# Patient Record
Sex: FEMALE | NOT HISPANIC OR LATINO | Employment: FULL TIME | URBAN - METROPOLITAN AREA
[De-identification: names, ages, dates, MRNs, and addresses within clinical notes are randomized per-mention and may not be internally consistent; named-entity substitution may affect disease eponyms.]

---

## 2017-10-02 ENCOUNTER — OFFICE VISIT (OUTPATIENT)
Dept: FAMILY MEDICINE | Facility: CLINIC | Age: 37
End: 2017-10-02
Payer: MEDICARE

## 2017-10-02 VITALS
DIASTOLIC BLOOD PRESSURE: 68 MMHG | TEMPERATURE: 98 F | OXYGEN SATURATION: 97 % | BODY MASS INDEX: 27.1 KG/M2 | HEIGHT: 64 IN | SYSTOLIC BLOOD PRESSURE: 110 MMHG | RESPIRATION RATE: 20 BRPM | WEIGHT: 158.75 LBS | HEART RATE: 92 BPM

## 2017-10-02 DIAGNOSIS — J42 CHRONIC BRONCHITIS WITH ACUTE EXACERBATION: Primary | ICD-10-CM

## 2017-10-02 DIAGNOSIS — G89.29 CHRONIC NECK PAIN: ICD-10-CM

## 2017-10-02 DIAGNOSIS — R05.9 COUGH: ICD-10-CM

## 2017-10-02 DIAGNOSIS — J20.9 CHRONIC BRONCHITIS WITH ACUTE EXACERBATION: Primary | ICD-10-CM

## 2017-10-02 DIAGNOSIS — R06.02 SHORTNESS OF BREATH ON EXERTION: ICD-10-CM

## 2017-10-02 DIAGNOSIS — M54.2 CHRONIC NECK PAIN: ICD-10-CM

## 2017-10-02 PROCEDURE — 99204 OFFICE O/P NEW MOD 45 MIN: CPT | Mod: PBBFAC,PO | Performed by: NURSE PRACTITIONER

## 2017-10-02 PROCEDURE — 99999 PR PBB SHADOW E&M-NEW PATIENT-LVL IV: CPT | Mod: PBBFAC,,, | Performed by: NURSE PRACTITIONER

## 2017-10-02 PROCEDURE — 99203 OFFICE O/P NEW LOW 30 MIN: CPT | Mod: S$PBB,,, | Performed by: NURSE PRACTITIONER

## 2017-10-02 RX ORDER — CARBAMAZEPINE 100 MG/1
300 TABLET, CHEWABLE ORAL NIGHTLY
Status: ON HOLD | COMMUNITY
End: 2017-11-03

## 2017-10-02 RX ORDER — OLANZAPINE 20 MG/1
20 TABLET ORAL NIGHTLY
COMMUNITY

## 2017-10-02 RX ORDER — ATOMOXETINE 60 MG/1
60 CAPSULE ORAL EVERY MORNING
COMMUNITY

## 2017-10-02 RX ORDER — IBUPROFEN 800 MG/1
800 TABLET ORAL 3 TIMES DAILY PRN
Qty: 30 TABLET | Refills: 0 | Status: ON HOLD | OUTPATIENT
Start: 2017-10-02 | End: 2017-11-03 | Stop reason: HOSPADM

## 2017-10-02 RX ORDER — OLANZAPINE 5 MG/1
5 TABLET ORAL EVERY MORNING
COMMUNITY
Start: 2017-09-02

## 2017-10-02 RX ORDER — DOXEPIN HYDROCHLORIDE 150 MG/1
150 CAPSULE ORAL NIGHTLY PRN
COMMUNITY

## 2017-10-02 RX ORDER — GABAPENTIN 400 MG/1
400 CAPSULE ORAL 3 TIMES DAILY
COMMUNITY
Start: 2017-09-15

## 2017-10-02 RX ORDER — BENZONATATE 200 MG/1
200 CAPSULE ORAL 3 TIMES DAILY PRN
Qty: 30 CAPSULE | Refills: 0 | Status: SHIPPED | OUTPATIENT
Start: 2017-10-02 | End: 2017-10-12

## 2017-10-02 RX ORDER — NAPROXEN 500 MG/1
500 TABLET ORAL 2 TIMES DAILY
COMMUNITY
End: 2017-10-02

## 2017-10-02 RX ORDER — CLONAZEPAM 1 MG/1
1 TABLET ORAL 3 TIMES DAILY
Status: ON HOLD | COMMUNITY
Start: 2017-09-15 | End: 2017-11-03

## 2017-10-02 RX ORDER — ALBUTEROL SULFATE 90 UG/1
2 AEROSOL, METERED RESPIRATORY (INHALATION) EVERY 6 HOURS PRN
Qty: 18 G | Refills: 0 | Status: SHIPPED | OUTPATIENT
Start: 2017-10-02 | End: 2018-10-02

## 2017-10-02 RX ORDER — CARBAMAZEPINE 200 MG/1
200 TABLET ORAL EVERY MORNING
COMMUNITY
Start: 2017-09-15

## 2017-10-02 NOTE — PATIENT INSTRUCTIONS
What Is Acute Bronchitis?  Acute bronchitis is when the airways in your lungs (bronchial tubes) become red and swollen (inflamed). It is usually caused by a viral infection. But it can also occur because of a bacteria or allergen. Symptoms include a cough that produces yellow or greenish mucus and can last for days or sometimes weeks.  Inside healthy lungs    Air travels in and out of the lungs through the airways. The linings of these airways produce sticky mucus. This mucus traps particles that enter the lungs. Tiny structures called cilia then sweep the particles out of the airways.     Healthy airway: Airways are normally open. Air moves in and out easily.      Healthy cilia: Tiny, hairlike cilia sweep mucus and particles up and out of the airways.   Lungs with bronchitis  Bronchitis often occurs with a cold or the flu virus. The airways become inflamed (red and swollen). There is a deep hacking cough from the extra mucus. Other symptoms may include:  · Wheezing  · Chest discomfort  · Shortness of breath  · Mild fever  A second infection, this time due to bacteria, may then occur. And airways irritated by allergens or smoke are more likely to get infected.        Inflamed airway: Inflammation and extra mucus narrow the airway, causing shortness of breath.      Impaired cilia: Extra mucus impairs cilia, causing congestion and wheezing. Smoking makes the problem worse.   Making a diagnosis  A physical exam, health history, and certain tests help your healthcare provider make the diagnosis.  Health history  Your healthcare provider will ask you about your symptoms.  The exam  Your provider listens to your chest for signs of congestion. He or she may also check your ears, nose, and throat.  Possible tests  · A sputum test for bacteria. This requires a sample of mucus from your lungs.  · A nasal or throat swab. This tests to see if you have a bacterial infection.  · A chest X-ray. This is done if your healthcare  provider thinks you have pneumonia.  · Tests to check for an underlying condition. Other tests may be done to check for things such as allergies, asthma, or COPD (chronic obstructive pulmonary disease). You may need to see a specialist for more lung function testing.  Treating a cough  The main treatment for bronchitis is easing symptoms. Avoiding smoke, allergens, and other things that trigger coughing can often help. If the infection is bacterial, you may be given antibiotics. During the illness, it's important to get plenty of sleep. To ease symptoms:  · Dont smoke. Also avoid secondhand smoke.  · Use a humidifier. Or try breathing in steam from a hot shower. This may help loosen mucus.  · Drink a lot of water and juice. They can soothe the throat and may help thin mucus.  · Sit up or use extra pillows when in bed. This helps to lessen coughing and congestion.  · Ask your provider about using medicine. Ask about using cough medicine, pain and fever medicine, or a decongestant.  Antibiotics  Most cases of bronchitis are caused by cold or flu viruses. They dont need antibiotics to treat them, even if your mucus is thick and green or yellow. Antibiotics dont treat viral illness and antibiotics have not been shown to have any benefit in cases of acute bronchitis. Taking antibiotics when they are not needed increases your risk of getting an infection later that is antibiotic-resistant. Antibiotics can also cause severe cases of diarrhea that require other antibiotics to treat.  It is important that you accept your healthcare provider's opinion to not use antibiotics. Your provider will prescribe antibiotics if the infection is caused by bacteria. If they are prescribed:  · Take all of the medicine. Take the medicine until it is used up, even if symptoms have improved. If you dont, the bronchitis may come back.  · Take the medicines as directed. For instance, some medicines should be taken with food.  · Ask about  side effects. Ask your provider or pharmacist what side effects are common, and what to do about them.  Follow-up care  You should see your provider again in 2 to 3 weeks. By this time, symptoms should have improved. An infection that lasts longer may mean you have a more serious problem.  Prevention  · Avoid tobacco smoke. If you smoke, quit. Stay away from smoky places. Ask friends and family not to smoke around you, or in your home or car.  · Get checked for allergies.  · Ask your provider about getting a yearly flu shot. Also ask about pneumococcal or pneumonia shots.  · Wash your hands often. This helps reduce the chance of picking up viruses that cause colds and flu.  Call your healthcare provider if:  · Symptoms worsen, or you have new symptoms  · Breathing problems worsen or  become severe  · Symptoms dont get better within a week, or within 3 days of taking antibiotics   Date Last Reviewed: 2/1/2017  © 0779-6091 [employer], [employer]. [address] [place] [address], [place] [address]. All rights reserved. This information is not intended as a substitute for professional medical care. Always follow your healthcare professional's instructions.
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
## 2017-10-17 ENCOUNTER — TELEPHONE (OUTPATIENT)
Dept: FAMILY MEDICINE | Facility: CLINIC | Age: 37
End: 2017-10-17

## 2017-10-17 NOTE — TELEPHONE ENCOUNTER
Left message for patient to call the office back regarding her appointment for Thursday 10/19. We need to reschedule due to Dr. Barfield's availability. Waiting on call back.

## 2017-10-30 ENCOUNTER — OFFICE VISIT (OUTPATIENT)
Dept: HEMATOLOGY/ONCOLOGY | Facility: CLINIC | Age: 37
End: 2017-10-30
Payer: MEDICARE

## 2017-10-30 VITALS
SYSTOLIC BLOOD PRESSURE: 112 MMHG | DIASTOLIC BLOOD PRESSURE: 73 MMHG | TEMPERATURE: 98 F | RESPIRATION RATE: 18 BRPM | BODY MASS INDEX: 28.91 KG/M2 | HEART RATE: 96 BPM | WEIGHT: 168.38 LBS

## 2017-10-30 DIAGNOSIS — N92.0 MENORRHAGIA WITH REGULAR CYCLE: ICD-10-CM

## 2017-10-30 DIAGNOSIS — Z98.84 H/O BARIATRIC SURGERY: ICD-10-CM

## 2017-10-30 DIAGNOSIS — K95.89 IRON DEFICIENCY ANEMIA FOLLOWING BARIATRIC SURGERY: ICD-10-CM

## 2017-10-30 DIAGNOSIS — D50.8 IRON DEFICIENCY ANEMIA FOLLOWING BARIATRIC SURGERY: ICD-10-CM

## 2017-10-30 DIAGNOSIS — D50.9 MICROCYTIC ANEMIA: ICD-10-CM

## 2017-10-30 PROCEDURE — 99203 OFFICE O/P NEW LOW 30 MIN: CPT | Mod: ,,, | Performed by: INTERNAL MEDICINE

## 2017-10-30 RX ORDER — DIPHENHYDRAMINE HYDROCHLORIDE 50 MG/ML
25 INJECTION INTRAMUSCULAR; INTRAVENOUS
Status: CANCELLED
Start: 2017-11-02

## 2017-10-30 NOTE — PROGRESS NOTES
[facility] Hematolgy/Oncology  History & Physical    Subjective:      Patient ID:   NAME: Amaris Kruger : 1980     37 y.o. female    Referring Doc: Leslye Calvert NP; RODNEY Maddox  Other Physicians: Mario Roman        Chief Complaint:   Anemia referral    HPI:  37 y.o. female with diagnosis of iron deficiency anemia since 2015 at least who has been referred by [facility] for evaluation by medical hematology/oncology. She had history of gastric bypass in . She has required several blood transfusions per year since . She was seen by Dr Mario Gutierrez with hematology in [facility] in  and received IV ferrlicet. She just recently moved to [place]. She has been to ER at [facility] recently with fatigue and lack energy. She has been passing out on occasion and having some dizziness; no CP, SOB, HA's or N/V. Her periods have been light and she denies any BRBPR. She reports that in past she has had heavy periods.             ROS:   GEN: normal without any fever, night sweats or weight loss  HEENT: normal with no HA's, sore throat, stiff neck, changes in vision  CV: normal with no CP, SOB, PND, MCFARLANE or orthopnea  PULM: normal with no SOB, cough, hemoptysis, sputum or pleuritic pain  GI: normal with no abdominal pain, nausea, vomiting, constipation, diarrhea, melanotic stools, BRBPR, or hematemesis  : normal with no hematuria, dysuria  BREAST: normal with no mass, discharge, pain  SKIN: normal with no rash, erythema, bruising, or swelling       Past Medical/Surgical History:  Past Medical History:   Diagnosis Date    Anxiety     Back pain     Bipolar 1 disorder     Bipolar 1 disorder     Gastric bypass status for obesity     Microcytic anemia 10/30/2017     Past Surgical History:   Procedure Laterality Date     SECTION, CLASSIC      CHOLECYSTECTOMY      GASTRIC BYPASS      GASTRIC BYPASS           Allergies:  Review of patient's allergies indicates:   Allergen Reactions     Haldol [haloperidol lactate] Other (See Comments)    Effexor xr [venlafaxine] Itching       Social/Family History:  Social History     Social History    Marital status: Single     Spouse name: N/A    Number of children: N/A    Years of education: N/A     Occupational History    Not on file.     Social History Main Topics    Smoking status: Current Every Day Smoker     Packs/day: 0.50     Years: 19.00     Types: Vaping with nicotine    Smokeless tobacco: Never Used      Comment: patient has tried with chantrix    Alcohol use No    Drug use: No    Sexual activity: Yes     Birth control/ protection: None     Other Topics Concern    Not on file     Social History Narrative    No narrative on file     Family History   Problem Relation Age of Onset    Hypertension Paternal Uncle          Medications:    Current Outpatient Prescriptions:     albuterol 90 mcg/actuation inhaler, Inhale 2 puffs into the lungs every 6 (six) hours as needed for Wheezing. Rescue, Disp: 18 g, Rfl: 0    atomoxetine (STRATTERA) 18 MG capsule, Take 18 mg by mouth once daily., Disp: , Rfl:     carbamazepine (TEGRETOL) 200 mg tablet, Take 200 mg by mouth once daily., Disp: , Rfl:     clonazePAM (KLONOPIN) 1 MG tablet, Take 1 mg by mouth 3 (three) times daily., Disp: , Rfl:     doxepin (SINEQUAN) 150 MG Cap, Take 150 mg by mouth nightly as needed., Disp: , Rfl:     gabapentin (NEURONTIN) 300 MG capsule, Take 300 mg by mouth once daily., Disp: , Rfl:     ibuprofen (ADVIL,MOTRIN) 800 MG tablet, Take 1 tablet (800 mg total) by mouth 3 (three) times daily as needed for Pain. DO NOT TAKE WITH NAPROXEN OR ANY OTHER NSAID OR ASPIRIN., Disp: 30 tablet, Rfl: 0    olanzapine (ZYPREXA) 20 MG tablet, Take 20 mg by mouth every evening., Disp: , Rfl:     carbamazepine (TEGRETOL) 100 mg chewable tablet, 300 mg once daily., Disp: , Rfl:     olanzapine (ZYPREXA) 5 MG tablet, Take 5 mg by mouth 2 (two) times daily., Disp: , Rfl:        Pathology:  No matching staging information was found for the patient.      Objective:   Vitals:  Blood pressure 112/73, pulse 96, temperature 98.3 °F (36.8 °C), resp. rate 18, weight 76.4 kg (168 lb 6.4 oz), last menstrual period 09/02/2017.    Physical Examination:   GEN: no apparent distress, comfortable; AAOx3  HEAD: atraumatic and normocephalic  EYES: no pallor, no icterus, PERRLA  ENT: OMM, no pharyngeal erythema, external ears WNL; no nasal discharge; no thrush  NECK: no masses, thyroid normal, trachea midline, no LAD/LN's, supple  CV: RRR with no murmur; normal pulse; normal S1 and S2; no pedal edema  CHEST: Normal respiratory effort; CTAB; normal breath sounds; no wheeze or crackles  ABDOM: nontender and nondistended; soft; normal bowel sounds; no rebound/guarding  MUSC/Skeletal: ROM normal; no crepitus; joints normal; no deformities or arthropathy  EXTREM: no clubbing, cyanosis, inflammation or swelling  SKIN: no rashes, lesions, ulcers, petechiae or subcutaneous nodules  : no graham  NEURO: grossly intact; motor/sensory WNL; AAOx3; no tremors  PSYCH: normal mood, affect and behavior  LYMPH: normal cervical, supraclavicular, axillary and groin LN's      Labs:     10/27/2017   7/31/2015 in records    Radiology/Diagnostic Studies:          All lab results and imaging results have been reviewed and discussed with the patient    Assessment:   (1) 37 y.o. female with diagnosis of chronic iron deficiency anemia after having gastric bypass in 2014  - she was seen by Dr Mario Gutierrez with hematology in [facility] in 2015 for ferrlicet infusions  - latest hgb is at 7.5 with MCV of 67.1  - she seems to be symtomatic    (2) Chronic bronchitis/asthma/chronic smoker    (3) Chronic neck issues    (4) GERD    (5) Bipolar disorder            Plan:           PLAN:  1. Check up to date iron panel and ferrtin  2. Set up two units of blood then set up IV ferrlicet x 8 weeks  3. F/u with PCP  4.  RTC in 4 weeks   Fax  note to No ref. provider found        I have explained and the patient understands all of  the current recommendation(s). I have answered all of their questions to the best of my ability and to their complete satisfaction.             Thank you for allowing me to participate in this patient's care. Please call with any questions or concerns.    Electronically signed Marquis Sumner MD

## 2017-11-01 ENCOUNTER — ANESTHESIA (OUTPATIENT)
Dept: SURGERY | Facility: HOSPITAL | Age: 37
DRG: 355 | End: 2017-11-01
Payer: MEDICARE

## 2017-11-01 ENCOUNTER — HOSPITAL ENCOUNTER (INPATIENT)
Facility: HOSPITAL | Age: 37
LOS: 1 days | Discharge: HOME | DRG: 355 | End: 2017-11-03
Attending: EMERGENCY MEDICINE | Admitting: FAMILY MEDICINE
Payer: MEDICARE

## 2017-11-01 ENCOUNTER — SURGERY (OUTPATIENT)
Age: 37
End: 2017-11-01

## 2017-11-01 ENCOUNTER — ANESTHESIA EVENT (OUTPATIENT)
Dept: SURGERY | Facility: HOSPITAL | Age: 37
DRG: 355 | End: 2017-11-01
Payer: MEDICARE

## 2017-11-01 DIAGNOSIS — K56.2 VOLVULUS: Primary | ICD-10-CM

## 2017-11-01 DIAGNOSIS — Q43.3 MIDGUT VOLVULUS: ICD-10-CM

## 2017-11-01 DIAGNOSIS — R10.9 ABDOMINAL PAIN, UNSPECIFIED ABDOMINAL LOCATION: ICD-10-CM

## 2017-11-01 PROBLEM — F31.9 BIPOLAR 1 DISORDER: Status: ACTIVE | Noted: 2017-11-01

## 2017-11-01 PROBLEM — F17.210 CIGARETTE NICOTINE DEPENDENCE WITHOUT COMPLICATION: Status: ACTIVE | Noted: 2017-11-01

## 2017-11-01 LAB
ABO + RH BLD: NORMAL
ALBUMIN SERPL BCP-MCNC: 3.1 G/DL
ALP SERPL-CCNC: 78 U/L
ALT SERPL W/O P-5'-P-CCNC: 39 U/L
ANION GAP SERPL CALC-SCNC: 11 MMOL/L
ANISOCYTOSIS BLD QL SMEAR: ABNORMAL
APTT BLDCRRT: 22 SEC
AST SERPL-CCNC: 32 U/L
BASOPHILS NFR BLD: 0 %
BILIRUB SERPL-MCNC: 0.1 MG/DL
BLD GP AB SCN CELLS X3 SERPL QL: NORMAL
BUN SERPL-MCNC: 12 MG/DL
CALCIUM SERPL-MCNC: 8.4 MG/DL
CHLORIDE SERPL-SCNC: 108 MMOL/L
CO2 SERPL-SCNC: 18 MMOL/L
CREAT SERPL-MCNC: 0.7 MG/DL
DIFFERENTIAL METHOD: ABNORMAL
EOSINOPHIL NFR BLD: 2 %
ERYTHROCYTE [DISTWIDTH] IN BLOOD BY AUTOMATED COUNT: 23.7 %
EST. GFR  (AFRICAN AMERICAN): >60 ML/MIN/1.73 M^2
EST. GFR  (NON AFRICAN AMERICAN): >60 ML/MIN/1.73 M^2
GLUCOSE SERPL-MCNC: 102 MG/DL
HCT VFR BLD AUTO: 27.6 %
HGB BLD-MCNC: 8.5 G/DL
HYPOCHROMIA BLD QL SMEAR: ABNORMAL
INR PPP: 0.9
LACTATE SERPL-SCNC: 0.4 MMOL/L
LIPASE SERPL-CCNC: 29 U/L
LYMPHOCYTES NFR BLD: 27 %
MCH RBC QN AUTO: 20.7 PG
MCHC RBC AUTO-ENTMCNC: 30.6 G/DL
MCV RBC AUTO: 68 FL
MONOCYTES NFR BLD: 3 %
NEUTROPHILS NFR BLD: 64 %
NEUTS BAND NFR BLD MANUAL: 4 %
PLATELET # BLD AUTO: 319 K/UL
PLATELET BLD QL SMEAR: ABNORMAL
PMV BLD AUTO: 7.4 FL
POLYCHROMASIA BLD QL SMEAR: ABNORMAL
POTASSIUM SERPL-SCNC: 3.5 MMOL/L
PROT SERPL-MCNC: 6.5 G/DL
PROTHROMBIN TIME: 9.9 SEC
RBC # BLD AUTO: 4.09 M/UL
SODIUM SERPL-SCNC: 137 MMOL/L
WBC # BLD AUTO: 4.7 K/UL

## 2017-11-01 PROCEDURE — 93005 ELECTROCARDIOGRAM TRACING: CPT

## 2017-11-01 PROCEDURE — 80053 COMPREHEN METABOLIC PANEL: CPT

## 2017-11-01 PROCEDURE — 36000706: Performed by: SURGERY

## 2017-11-01 PROCEDURE — 99291 CRITICAL CARE FIRST HOUR: CPT | Mod: 25

## 2017-11-01 PROCEDURE — 86850 RBC ANTIBODY SCREEN: CPT

## 2017-11-01 PROCEDURE — 93010 ELECTROCARDIOGRAM REPORT: CPT | Mod: ,,, | Performed by: INTERNAL MEDICINE

## 2017-11-01 PROCEDURE — 25000003 PHARM REV CODE 250: Performed by: EMERGENCY MEDICINE

## 2017-11-01 PROCEDURE — 83605 ASSAY OF LACTIC ACID: CPT

## 2017-11-01 PROCEDURE — 85730 THROMBOPLASTIN TIME PARTIAL: CPT

## 2017-11-01 PROCEDURE — 96375 TX/PRO/DX INJ NEW DRUG ADDON: CPT

## 2017-11-01 PROCEDURE — 86900 BLOOD TYPING SEROLOGIC ABO: CPT

## 2017-11-01 PROCEDURE — 96365 THER/PROPH/DIAG IV INF INIT: CPT

## 2017-11-01 PROCEDURE — 85610 PROTHROMBIN TIME: CPT

## 2017-11-01 PROCEDURE — 25500020 PHARM REV CODE 255

## 2017-11-01 PROCEDURE — 71000033 HC RECOVERY, INTIAL HOUR: Performed by: SURGERY

## 2017-11-01 PROCEDURE — 71000039 HC RECOVERY, EACH ADD'L HOUR: Performed by: SURGERY

## 2017-11-01 PROCEDURE — C1729 CATH, DRAINAGE: HCPCS | Performed by: SURGERY

## 2017-11-01 PROCEDURE — C1765 ADHESION BARRIER: HCPCS | Performed by: SURGERY

## 2017-11-01 PROCEDURE — C9113 INJ PANTOPRAZOLE SODIUM, VIA: HCPCS | Performed by: EMERGENCY MEDICINE

## 2017-11-01 PROCEDURE — 63600175 PHARM REV CODE 636 W HCPCS: Performed by: EMERGENCY MEDICINE

## 2017-11-01 PROCEDURE — 85007 BL SMEAR W/DIFF WBC COUNT: CPT

## 2017-11-01 PROCEDURE — 36000707: Performed by: SURGERY

## 2017-11-01 PROCEDURE — 37000009 HC ANESTHESIA EA ADD 15 MINS: Performed by: SURGERY

## 2017-11-01 PROCEDURE — 37000008 HC ANESTHESIA 1ST 15 MINUTES: Performed by: SURGERY

## 2017-11-01 PROCEDURE — 96368 THER/DIAG CONCURRENT INF: CPT

## 2017-11-01 PROCEDURE — 27201423 OPTIME MED/SURG SUP & DEVICES STERILE SUPPLY: Performed by: SURGERY

## 2017-11-01 PROCEDURE — 86920 COMPATIBILITY TEST SPIN: CPT

## 2017-11-01 PROCEDURE — 96376 TX/PRO/DX INJ SAME DRUG ADON: CPT

## 2017-11-01 PROCEDURE — 85027 COMPLETE CBC AUTOMATED: CPT

## 2017-11-01 PROCEDURE — 83690 ASSAY OF LIPASE: CPT

## 2017-11-01 PROCEDURE — 36415 COLL VENOUS BLD VENIPUNCTURE: CPT

## 2017-11-01 RX ORDER — OLANZAPINE 5 MG/1
20 TABLET ORAL
Status: COMPLETED | OUTPATIENT
Start: 2017-11-01 | End: 2017-11-01

## 2017-11-01 RX ORDER — HYDROMORPHONE HYDROCHLORIDE 1 MG/ML
0.5 INJECTION, SOLUTION INTRAMUSCULAR; INTRAVENOUS; SUBCUTANEOUS
Status: COMPLETED | OUTPATIENT
Start: 2017-11-01 | End: 2017-11-01

## 2017-11-01 RX ORDER — PANTOPRAZOLE SODIUM 40 MG/10ML
INJECTION, POWDER, LYOPHILIZED, FOR SOLUTION INTRAVENOUS
Status: DISCONTINUED
Start: 2017-11-01 | End: 2017-11-01 | Stop reason: WASHOUT

## 2017-11-01 RX ORDER — HYDROMORPHONE HYDROCHLORIDE 1 MG/ML
1 INJECTION, SOLUTION INTRAMUSCULAR; INTRAVENOUS; SUBCUTANEOUS
Status: COMPLETED | OUTPATIENT
Start: 2017-11-01 | End: 2017-11-01

## 2017-11-01 RX ORDER — ONDANSETRON 2 MG/ML
8 INJECTION INTRAMUSCULAR; INTRAVENOUS
Status: COMPLETED | OUTPATIENT
Start: 2017-11-01 | End: 2017-11-01

## 2017-11-01 RX ORDER — DEXTROSE MONOHYDRATE, SODIUM CHLORIDE, AND POTASSIUM CHLORIDE 50; 1.49; 4.5 G/1000ML; G/1000ML; G/1000ML
1000 INJECTION, SOLUTION INTRAVENOUS
Status: COMPLETED | OUTPATIENT
Start: 2017-11-01 | End: 2017-11-01

## 2017-11-01 RX ORDER — DOXEPIN HYDROCHLORIDE 25 MG/1
150 CAPSULE ORAL
Status: COMPLETED | OUTPATIENT
Start: 2017-11-01 | End: 2017-11-01

## 2017-11-01 RX ORDER — PANTOPRAZOLE SODIUM 40 MG/10ML
80 INJECTION, POWDER, LYOPHILIZED, FOR SOLUTION INTRAVENOUS
Status: COMPLETED | OUTPATIENT
Start: 2017-11-01 | End: 2017-11-01

## 2017-11-01 RX ORDER — ACETAMINOPHEN 10 MG/ML
1000 INJECTION, SOLUTION INTRAVENOUS
Status: COMPLETED | OUTPATIENT
Start: 2017-11-01 | End: 2017-11-01

## 2017-11-01 RX ORDER — SUCRALFATE 1 G/10ML
1 SUSPENSION ORAL
Status: COMPLETED | OUTPATIENT
Start: 2017-11-01 | End: 2017-11-01

## 2017-11-01 RX ORDER — CLONAZEPAM 0.5 MG/1
1 TABLET ORAL
Status: COMPLETED | OUTPATIENT
Start: 2017-11-01 | End: 2017-11-01

## 2017-11-01 RX ORDER — MORPHINE SULFATE 2 MG/ML
6 INJECTION, SOLUTION INTRAMUSCULAR; INTRAVENOUS
Status: DISCONTINUED | OUTPATIENT
Start: 2017-11-01 | End: 2017-11-01

## 2017-11-01 RX ADMIN — DEXTROSE, SODIUM CHLORIDE, AND POTASSIUM CHLORIDE 1000 ML: 5; .45; .15 INJECTION INTRAVENOUS at 11:11

## 2017-11-01 RX ADMIN — ONDANSETRON 8 MG: 2 INJECTION INTRAMUSCULAR; INTRAVENOUS at 08:11

## 2017-11-01 RX ADMIN — ACETAMINOPHEN 1000 MG: 10 INJECTION, SOLUTION INTRAVENOUS at 10:11

## 2017-11-01 RX ADMIN — HYDROMORPHONE HYDROCHLORIDE 1 MG: 1 INJECTION, SOLUTION INTRAMUSCULAR; INTRAVENOUS; SUBCUTANEOUS at 10:11

## 2017-11-01 RX ADMIN — PANTOPRAZOLE SODIUM 80 MG: 40 INJECTION, POWDER, FOR SOLUTION INTRAVENOUS at 08:11

## 2017-11-01 RX ADMIN — HYDROMORPHONE HYDROCHLORIDE 0.5 MG: 1 INJECTION, SOLUTION INTRAMUSCULAR; INTRAVENOUS; SUBCUTANEOUS at 08:11

## 2017-11-01 RX ADMIN — OLANZAPINE 20 MG: 5 TABLET, FILM COATED ORAL at 10:11

## 2017-11-01 RX ADMIN — IOHEXOL 75 ML: 350 INJECTION, SOLUTION INTRAVENOUS at 08:11

## 2017-11-01 RX ADMIN — SODIUM CHLORIDE 1000 ML: 0.9 INJECTION, SOLUTION INTRAVENOUS at 10:11

## 2017-11-01 RX ADMIN — PIPERACILLIN SODIUM AND TAZOBACTAM SODIUM 4.5 G: 4; .5 INJECTION, POWDER, LYOPHILIZED, FOR SOLUTION INTRAVENOUS at 11:11

## 2017-11-01 RX ADMIN — DOXEPIN HYDROCHLORIDE 150 MG: 25 CAPSULE ORAL at 10:11

## 2017-11-01 RX ADMIN — CLONAZEPAM 1 MG: 0.5 TABLET ORAL at 10:11

## 2017-11-01 RX ADMIN — IOHEXOL 15 ML: 350 INJECTION, SOLUTION INTRAVENOUS at 08:11

## 2017-11-01 RX ADMIN — SUCRALFATE 1 G: 1 SUSPENSION ORAL at 08:11
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
## 2017-11-02 PROBLEM — Q43.3 MIDGUT VOLVULUS: Status: ACTIVE | Noted: 2017-11-02

## 2017-11-02 PROBLEM — Q43.3 MIDGUT VOLVULUS: Status: RESOLVED | Noted: 2017-11-02 | Resolved: 2017-11-02

## 2017-11-02 LAB
ANION GAP SERPL CALC-SCNC: 7 MMOL/L
ANISOCYTOSIS BLD QL SMEAR: ABNORMAL
BASOPHILS NFR BLD: 0 %
BUN SERPL-MCNC: 5 MG/DL
CALCIUM SERPL-MCNC: 8 MG/DL
CHLORIDE SERPL-SCNC: 112 MMOL/L
CO2 SERPL-SCNC: 20 MMOL/L
CREAT SERPL-MCNC: 0.6 MG/DL
DIFFERENTIAL METHOD: ABNORMAL
EOSINOPHIL NFR BLD: 0 %
ERYTHROCYTE [DISTWIDTH] IN BLOOD BY AUTOMATED COUNT: 22.9 %
EST. GFR  (AFRICAN AMERICAN): >60 ML/MIN/1.73 M^2
EST. GFR  (NON AFRICAN AMERICAN): >60 ML/MIN/1.73 M^2
GLUCOSE SERPL-MCNC: 70 MG/DL
HCT VFR BLD AUTO: 25.1 %
HGB BLD-MCNC: 7.9 G/DL
HYPOCHROMIA BLD QL SMEAR: ABNORMAL
LYMPHOCYTES NFR BLD: 15 %
MCH RBC QN AUTO: 20.9 PG
MCHC RBC AUTO-ENTMCNC: 31.3 G/DL
MCV RBC AUTO: 67 FL
MONOCYTES NFR BLD: 3 %
NEUTROPHILS NFR BLD: 82 %
OVALOCYTES BLD QL SMEAR: ABNORMAL
PLATELET # BLD AUTO: 289 K/UL
PLATELET BLD QL SMEAR: ABNORMAL
PMV BLD AUTO: 8 FL
POIKILOCYTOSIS BLD QL SMEAR: SLIGHT
POTASSIUM SERPL-SCNC: 3.6 MMOL/L
RBC # BLD AUTO: 3.76 M/UL
SODIUM SERPL-SCNC: 139 MMOL/L
WBC # BLD AUTO: 5.2 K/UL

## 2017-11-02 PROCEDURE — 63600175 PHARM REV CODE 636 W HCPCS: Performed by: NURSE ANESTHETIST, CERTIFIED REGISTERED

## 2017-11-02 PROCEDURE — 25000003 PHARM REV CODE 250: Performed by: SURGERY

## 2017-11-02 PROCEDURE — C9290 INJ, BUPIVACAINE LIPOSOME: HCPCS | Performed by: SURGERY

## 2017-11-02 PROCEDURE — 80048 BASIC METABOLIC PNL TOTAL CA: CPT

## 2017-11-02 PROCEDURE — 99223 1ST HOSP IP/OBS HIGH 75: CPT | Mod: 57,25,, | Performed by: SURGERY

## 2017-11-02 PROCEDURE — 85007 BL SMEAR W/DIFF WBC COUNT: CPT

## 2017-11-02 PROCEDURE — 0WQF0ZZ REPAIR ABDOMINAL WALL, OPEN APPROACH: ICD-10-PCS | Performed by: SURGERY

## 2017-11-02 PROCEDURE — 25000003 PHARM REV CODE 250: Performed by: NURSE ANESTHETIST, CERTIFIED REGISTERED

## 2017-11-02 PROCEDURE — D9220A PRA ANESTHESIA: Mod: CRNA,,, | Performed by: NURSE ANESTHETIST, CERTIFIED REGISTERED

## 2017-11-02 PROCEDURE — 63600175 PHARM REV CODE 636 W HCPCS: Performed by: SURGERY

## 2017-11-02 PROCEDURE — D9220A PRA ANESTHESIA: Mod: ANES,,, | Performed by: ANESTHESIOLOGY

## 2017-11-02 PROCEDURE — 27000221 HC OXYGEN, UP TO 24 HOURS

## 2017-11-02 PROCEDURE — 87070 CULTURE OTHR SPECIMN AEROBIC: CPT

## 2017-11-02 PROCEDURE — 87075 CULTR BACTERIA EXCEPT BLOOD: CPT

## 2017-11-02 PROCEDURE — 44050 REDUCE BOWEL OBSTRUCTION: CPT | Mod: ,,, | Performed by: SURGERY

## 2017-11-02 PROCEDURE — 25000003 PHARM REV CODE 250: Performed by: NURSE PRACTITIONER

## 2017-11-02 PROCEDURE — 94761 N-INVAS EAR/PLS OXIMETRY MLT: CPT

## 2017-11-02 PROCEDURE — 85027 COMPLETE CBC AUTOMATED: CPT

## 2017-11-02 PROCEDURE — 36415 COLL VENOUS BLD VENIPUNCTURE: CPT

## 2017-11-02 PROCEDURE — 12000002 HC ACUTE/MED SURGE SEMI-PRIVATE ROOM

## 2017-11-02 DEVICE — MEMBRANE SEPRAFILM 5 X 6: Type: IMPLANTABLE DEVICE | Site: ABDOMEN | Status: FUNCTIONAL

## 2017-11-02 RX ORDER — ACETAMINOPHEN 500 MG
1000 TABLET ORAL EVERY 6 HOURS PRN
Status: DISCONTINUED | OUTPATIENT
Start: 2017-11-02 | End: 2017-11-03 | Stop reason: HOSPADM

## 2017-11-02 RX ORDER — SODIUM CHLORIDE 0.9 % (FLUSH) 0.9 %
3 SYRINGE (ML) INJECTION
Status: DISCONTINUED | OUTPATIENT
Start: 2017-11-02 | End: 2017-11-03 | Stop reason: HOSPADM

## 2017-11-02 RX ORDER — MORPHINE SULFATE 2 MG/ML
2 INJECTION, SOLUTION INTRAMUSCULAR; INTRAVENOUS EVERY 4 HOURS PRN
Status: DISCONTINUED | OUTPATIENT
Start: 2017-11-02 | End: 2017-11-02

## 2017-11-02 RX ORDER — MUPIROCIN 20 MG/G
1 OINTMENT TOPICAL 2 TIMES DAILY
Status: DISCONTINUED | OUTPATIENT
Start: 2017-11-02 | End: 2017-11-03 | Stop reason: HOSPADM

## 2017-11-02 RX ORDER — CLONAZEPAM 1 MG/1
1 TABLET ORAL 3 TIMES DAILY PRN
Status: DISCONTINUED | OUTPATIENT
Start: 2017-11-02 | End: 2017-11-03 | Stop reason: HOSPADM

## 2017-11-02 RX ORDER — METOCLOPRAMIDE HYDROCHLORIDE 5 MG/ML
10 INJECTION INTRAMUSCULAR; INTRAVENOUS EVERY 10 MIN PRN
Status: DISCONTINUED | OUTPATIENT
Start: 2017-11-02 | End: 2017-11-02 | Stop reason: HOSPADM

## 2017-11-02 RX ORDER — ONDANSETRON 2 MG/ML
INJECTION INTRAMUSCULAR; INTRAVENOUS
Status: DISCONTINUED | OUTPATIENT
Start: 2017-11-02 | End: 2017-11-02

## 2017-11-02 RX ORDER — MORPHINE SULFATE 4 MG/ML
4 INJECTION, SOLUTION INTRAMUSCULAR; INTRAVENOUS EVERY 4 HOURS PRN
Status: DISCONTINUED | OUTPATIENT
Start: 2017-11-02 | End: 2017-11-02

## 2017-11-02 RX ORDER — GLYCOPYRROLATE 0.2 MG/ML
INJECTION INTRAMUSCULAR; INTRAVENOUS
Status: DISCONTINUED | OUTPATIENT
Start: 2017-11-02 | End: 2017-11-02

## 2017-11-02 RX ORDER — HYDROMORPHONE HYDROCHLORIDE 2 MG/ML
0.2 INJECTION, SOLUTION INTRAMUSCULAR; INTRAVENOUS; SUBCUTANEOUS EVERY 5 MIN PRN
Status: DISCONTINUED | OUTPATIENT
Start: 2017-11-02 | End: 2017-11-02 | Stop reason: HOSPADM

## 2017-11-02 RX ORDER — CARBAMAZEPINE 200 MG/1
200 TABLET ORAL 2 TIMES DAILY
Status: DISCONTINUED | OUTPATIENT
Start: 2017-11-02 | End: 2017-11-03

## 2017-11-02 RX ORDER — ROCURONIUM BROMIDE 10 MG/ML
INJECTION, SOLUTION INTRAVENOUS
Status: DISCONTINUED | OUTPATIENT
Start: 2017-11-02 | End: 2017-11-02

## 2017-11-02 RX ORDER — HYDROMORPHONE HYDROCHLORIDE 2 MG/ML
1 INJECTION, SOLUTION INTRAMUSCULAR; INTRAVENOUS; SUBCUTANEOUS EVERY 4 HOURS PRN
Status: DISCONTINUED | OUTPATIENT
Start: 2017-11-02 | End: 2017-11-03

## 2017-11-02 RX ORDER — DEXAMETHASONE SODIUM PHOSPHATE 4 MG/ML
INJECTION, SOLUTION INTRA-ARTICULAR; INTRALESIONAL; INTRAMUSCULAR; INTRAVENOUS; SOFT TISSUE
Status: DISCONTINUED | OUTPATIENT
Start: 2017-11-02 | End: 2017-11-02

## 2017-11-02 RX ORDER — KETOROLAC TROMETHAMINE 30 MG/ML
15 INJECTION, SOLUTION INTRAMUSCULAR; INTRAVENOUS EVERY 6 HOURS PRN
Status: DISCONTINUED | OUTPATIENT
Start: 2017-11-02 | End: 2017-11-03

## 2017-11-02 RX ORDER — BUPIVACAINE HYDROCHLORIDE AND EPINEPHRINE 2.5; 5 MG/ML; UG/ML
INJECTION, SOLUTION EPIDURAL; INFILTRATION; INTRACAUDAL; PERINEURAL
Status: DISCONTINUED | OUTPATIENT
Start: 2017-11-02 | End: 2017-11-02 | Stop reason: HOSPADM

## 2017-11-02 RX ORDER — NEOSTIGMINE METHYLSULFATE 1 MG/ML
INJECTION, SOLUTION INTRAVENOUS
Status: DISCONTINUED | OUTPATIENT
Start: 2017-11-02 | End: 2017-11-02

## 2017-11-02 RX ORDER — ENOXAPARIN SODIUM 100 MG/ML
40 INJECTION SUBCUTANEOUS
Status: DISCONTINUED | OUTPATIENT
Start: 2017-11-02 | End: 2017-11-03 | Stop reason: HOSPADM

## 2017-11-02 RX ORDER — ONDANSETRON 2 MG/ML
8 INJECTION INTRAMUSCULAR; INTRAVENOUS EVERY 8 HOURS PRN
Status: DISCONTINUED | OUTPATIENT
Start: 2017-11-02 | End: 2017-11-03 | Stop reason: HOSPADM

## 2017-11-02 RX ORDER — SODIUM CHLORIDE 9 MG/ML
INJECTION, SOLUTION INTRAVENOUS CONTINUOUS
Status: DISCONTINUED | OUTPATIENT
Start: 2017-11-02 | End: 2017-11-02

## 2017-11-02 RX ORDER — ACETAMINOPHEN 10 MG/ML
1000 INJECTION, SOLUTION INTRAVENOUS EVERY 6 HOURS
Status: COMPLETED | OUTPATIENT
Start: 2017-11-02 | End: 2017-11-02

## 2017-11-02 RX ORDER — SUCCINYLCHOLINE CHLORIDE 20 MG/ML
INJECTION INTRAMUSCULAR; INTRAVENOUS
Status: DISCONTINUED | OUTPATIENT
Start: 2017-11-02 | End: 2017-11-02

## 2017-11-02 RX ORDER — SODIUM CHLORIDE 9 MG/ML
INJECTION, SOLUTION INTRAVENOUS CONTINUOUS
Status: DISCONTINUED | OUTPATIENT
Start: 2017-11-02 | End: 2017-11-03 | Stop reason: HOSPADM

## 2017-11-02 RX ORDER — LIDOCAINE HCL/PF 100 MG/5ML
SYRINGE (ML) INTRAVENOUS
Status: DISCONTINUED | OUTPATIENT
Start: 2017-11-02 | End: 2017-11-02

## 2017-11-02 RX ORDER — OLANZAPINE 5 MG/1
5 TABLET ORAL DAILY
Status: DISCONTINUED | OUTPATIENT
Start: 2017-11-02 | End: 2017-11-03 | Stop reason: HOSPADM

## 2017-11-02 RX ORDER — SODIUM CHLORIDE, SODIUM LACTATE, POTASSIUM CHLORIDE, CALCIUM CHLORIDE 600; 310; 30; 20 MG/100ML; MG/100ML; MG/100ML; MG/100ML
INJECTION, SOLUTION INTRAVENOUS CONTINUOUS PRN
Status: DISCONTINUED | OUTPATIENT
Start: 2017-11-02 | End: 2017-11-02

## 2017-11-02 RX ORDER — MORPHINE SULFATE 4 MG/ML
2 INJECTION, SOLUTION INTRAMUSCULAR; INTRAVENOUS EVERY 4 HOURS PRN
Status: DISCONTINUED | OUTPATIENT
Start: 2017-11-02 | End: 2017-11-03

## 2017-11-02 RX ORDER — GABAPENTIN 400 MG/1
400 CAPSULE ORAL 3 TIMES DAILY
Status: DISCONTINUED | OUTPATIENT
Start: 2017-11-02 | End: 2017-11-03 | Stop reason: HOSPADM

## 2017-11-02 RX ORDER — ETOMIDATE 2 MG/ML
INJECTION INTRAVENOUS
Status: DISCONTINUED | OUTPATIENT
Start: 2017-11-02 | End: 2017-11-02

## 2017-11-02 RX ORDER — AMOXICILLIN 250 MG
1 CAPSULE ORAL 2 TIMES DAILY
Status: DISCONTINUED | OUTPATIENT
Start: 2017-11-02 | End: 2017-11-03

## 2017-11-02 RX ORDER — ONDANSETRON 2 MG/ML
4 INJECTION INTRAMUSCULAR; INTRAVENOUS EVERY 12 HOURS PRN
Status: DISCONTINUED | OUTPATIENT
Start: 2017-11-02 | End: 2017-11-02

## 2017-11-02 RX ORDER — NALOXONE HCL 0.4 MG/ML
0.4 VIAL (ML) INJECTION
Status: DISCONTINUED | OUTPATIENT
Start: 2017-11-02 | End: 2017-11-03 | Stop reason: HOSPADM

## 2017-11-02 RX ORDER — OLANZAPINE 5 MG/1
20 TABLET ORAL NIGHTLY
Status: DISCONTINUED | OUTPATIENT
Start: 2017-11-02 | End: 2017-11-03 | Stop reason: HOSPADM

## 2017-11-02 RX ORDER — CARBAMAZEPINE 200 MG/1
200 TABLET ORAL NIGHTLY
Status: DISCONTINUED | OUTPATIENT
Start: 2017-11-02 | End: 2017-11-02

## 2017-11-02 RX ADMIN — AMPICILLIN AND SULBACTAM 3 G: 2; 1 INJECTION, POWDER, FOR SOLUTION INTRAMUSCULAR; INTRAVENOUS at 12:11

## 2017-11-02 RX ADMIN — OLANZAPINE 20 MG: 5 TABLET, FILM COATED ORAL at 11:11

## 2017-11-02 RX ADMIN — AMPICILLIN SODIUM AND SULBACTAM SODIUM 3 G: 2; 1 INJECTION, POWDER, FOR SOLUTION INTRAMUSCULAR; INTRAVENOUS at 06:11

## 2017-11-02 RX ADMIN — HYDROMORPHONE HYDROCHLORIDE 1 MG: 2 INJECTION INTRAMUSCULAR; INTRAVENOUS; SUBCUTANEOUS at 10:11

## 2017-11-02 RX ADMIN — GABAPENTIN 400 MG: 400 CAPSULE ORAL at 09:11

## 2017-11-02 RX ADMIN — KETOROLAC TROMETHAMINE 15 MG: 30 INJECTION, SOLUTION INTRAMUSCULAR at 09:11

## 2017-11-02 RX ADMIN — ONDANSETRON 4 MG: 2 INJECTION, SOLUTION INTRAMUSCULAR; INTRAVENOUS at 12:11

## 2017-11-02 RX ADMIN — ACETAMINOPHEN 1000 MG: 10 INJECTION, SOLUTION INTRAVENOUS at 05:11

## 2017-11-02 RX ADMIN — HYDROMORPHONE HYDROCHLORIDE 1 MG: 2 INJECTION INTRAMUSCULAR; INTRAVENOUS; SUBCUTANEOUS at 09:11

## 2017-11-02 RX ADMIN — BUPIVACAINE HYDROCHLORIDE AND EPINEPHRINE BITARTRATE 30 ML: 2.5; .0091 INJECTION, SOLUTION EPIDURAL; INFILTRATION; INTRACAUDAL; PERINEURAL at 12:11

## 2017-11-02 RX ADMIN — ROCURONIUM BROMIDE 10 MG: 10 INJECTION, SOLUTION INTRAVENOUS at 12:11

## 2017-11-02 RX ADMIN — SODIUM CHLORIDE, SODIUM LACTATE, POTASSIUM CHLORIDE, AND CALCIUM CHLORIDE: .6; .31; .03; .02 INJECTION, SOLUTION INTRAVENOUS at 12:11

## 2017-11-02 RX ADMIN — HYDROMORPHONE HYDROCHLORIDE 1 MG: 2 INJECTION INTRAMUSCULAR; INTRAVENOUS; SUBCUTANEOUS at 05:11

## 2017-11-02 RX ADMIN — OLANZAPINE 5 MG: 5 TABLET, FILM COATED ORAL at 09:11

## 2017-11-02 RX ADMIN — STANDARDIZED SENNA CONCENTRATE AND DOCUSATE SODIUM 1 TABLET: 8.6; 5 TABLET, FILM COATED ORAL at 09:11

## 2017-11-02 RX ADMIN — NEOSTIGMINE METHYLSULFATE 3 MG: 1 INJECTION INTRAVENOUS at 01:11

## 2017-11-02 RX ADMIN — SODIUM CHLORIDE, SODIUM LACTATE, POTASSIUM CHLORIDE, AND CALCIUM CHLORIDE: .6; .31; .03; .02 INJECTION, SOLUTION INTRAVENOUS at 01:11

## 2017-11-02 RX ADMIN — AMPICILLIN SODIUM AND SULBACTAM SODIUM 3 G: 2; 1 INJECTION, POWDER, FOR SOLUTION INTRAMUSCULAR; INTRAVENOUS at 05:11

## 2017-11-02 RX ADMIN — SUCCINYLCHOLINE CHLORIDE 200 MG: 20 INJECTION, SOLUTION INTRAMUSCULAR; INTRAVENOUS at 12:11

## 2017-11-02 RX ADMIN — CARBAMAZEPINE 200 MG: 200 TABLET ORAL at 09:11

## 2017-11-02 RX ADMIN — ENOXAPARIN SODIUM 40 MG: 100 INJECTION SUBCUTANEOUS at 05:11

## 2017-11-02 RX ADMIN — CALCIUM CHLORIDE 500 MG: 100 INJECTION, SOLUTION INTRAVENOUS at 12:11

## 2017-11-02 RX ADMIN — AMPICILLIN SODIUM AND SULBACTAM SODIUM 3 G: 2; 1 INJECTION, POWDER, FOR SOLUTION INTRAMUSCULAR; INTRAVENOUS at 01:11

## 2017-11-02 RX ADMIN — ACETAMINOPHEN 1000 MG: 10 INJECTION, SOLUTION INTRAVENOUS at 01:11

## 2017-11-02 RX ADMIN — LIDOCAINE HYDROCHLORIDE 100 MG: 20 INJECTION, SOLUTION INTRAVENOUS at 12:11

## 2017-11-02 RX ADMIN — BUPIVACAINE 20 ML: 13.3 INJECTION, SUSPENSION, LIPOSOMAL INFILTRATION at 12:11

## 2017-11-02 RX ADMIN — ROCURONIUM BROMIDE 20 MG: 10 INJECTION, SOLUTION INTRAVENOUS at 12:11

## 2017-11-02 RX ADMIN — CLONAZEPAM 1 MG: 1 TABLET ORAL at 09:11

## 2017-11-02 RX ADMIN — HYDROMORPHONE HYDROCHLORIDE 1 MG: 2 INJECTION INTRAMUSCULAR; INTRAVENOUS; SUBCUTANEOUS at 01:11

## 2017-11-02 RX ADMIN — DEXAMETHASONE SODIUM PHOSPHATE 4 MG: 4 INJECTION, SOLUTION INTRAMUSCULAR; INTRAVENOUS at 12:11

## 2017-11-02 RX ADMIN — CLONAZEPAM 1 MG: 1 TABLET ORAL at 08:11

## 2017-11-02 RX ADMIN — ACETAMINOPHEN 1000 MG: 10 INJECTION, SOLUTION INTRAVENOUS at 11:11

## 2017-11-02 RX ADMIN — ETOMIDATE 20 MG: 2 INJECTION, SOLUTION INTRAVENOUS at 12:11

## 2017-11-02 RX ADMIN — GABAPENTIN 400 MG: 400 CAPSULE ORAL at 01:11

## 2017-11-02 RX ADMIN — SODIUM CHLORIDE: 0.9 INJECTION, SOLUTION INTRAVENOUS at 04:11

## 2017-11-02 RX ADMIN — GLYCOPYRROLATE 0.4 MG: 0.2 INJECTION, SOLUTION INTRAMUSCULAR; INTRAVENOUS at 01:11

## 2017-11-02 RX ADMIN — MUPIROCIN 1 G: 20 OINTMENT TOPICAL at 11:11

## 2017-11-02 RX ADMIN — MUPIROCIN 1 G: 20 OINTMENT TOPICAL at 09:11

## 2017-11-02 NOTE — TRANSFER OF CARE
"Anesthesia Transfer of Care Note    Patient: Amaris Kruger    Procedure(s) Performed: Procedure(s) (LRB):  EXPLORATORY-LAPAROTOMY (N/A)  REPAIR-HERNIA (N/A)    Patient location: PACU    Anesthesia Type: general    Transport from OR: Transported from OR on 2-3 L/min O2 by NC with adequate spontaneous ventilation    Post pain: adequate analgesia    Post assessment: no apparent anesthetic complications and tolerated procedure well    Post vital signs: stable    Level of consciousness: sedated and responds to stimulation    Nausea/Vomiting: no nausea/vomiting    Complications: none    Transfer of care protocol was followed      Last vitals:   Visit Vitals  BP (!) 99/54   Pulse 83   Temp 36.6 °C (97.9 °F) (Oral)   Resp 16   Ht 5' 4" (1.626 m)   Wt 75.3 kg (166 lb)   LMP 08/16/2017 (Approximate)   SpO2 98%   BMI 28.49 kg/m²     "

## 2017-11-02 NOTE — CONSULTS
[facility]  General Surgery  Consult Note    Patient Name: Amaris Kruger  MRN: [id number]  Code Status: No Order  Admission Date: 11/1/2017  Hospital Length of Stay: 0 days  Attending Physician: Kamar Hernandez MD  Primary Care Provider: Primary Doctor No    Patient information was obtained from patient, parent and past medical records.     Consults  Subjective:     Principal Problem: Volvulus    History of Present Illness: 38 yo F presenting to the hospital with abdominal pain. Pt with medical history significant for both schizophrenia and bipolar disorder.  Pt reports having had lap Ry gastric bypass in 2002.  Pt reportedly began having vague abdominal pain 5 days ago.  She was seen at an outside facility with anemia and reportedly left AMA.  Pt reported to this facility this evening with worsening abdominal pain and discomfort.  CT scan performed in the ER demonstrating findings suggestive of volvulus with vascular compromise.  Pt received her psychiatric medications in the ER and was somnolent on my exam.  Pt reportedly lives in a group home.      No current facility-administered medications on file prior to encounter.      Current Outpatient Prescriptions on File Prior to Encounter   Medication Sig    albuterol 90 mcg/actuation inhaler Inhale 2 puffs into the lungs every 6 (six) hours as needed for Wheezing. Rescue    atomoxetine (STRATTERA) 60 MG capsule Take 60 mg by mouth every morning.     carbamazepine (TEGRETOL) 100 mg chewable tablet Take 300 mg by mouth every evening.     carbamazepine (TEGRETOL) 200 mg tablet Take 200 mg by mouth every morning.     clonazePAM (KLONOPIN) 1 MG tablet Take 1 mg by mouth 3 (three) times daily.    doxepin (SINEQUAN) 150 MG Cap Take 150 mg by mouth nightly as needed.    gabapentin (NEURONTIN) 400 MG capsule Take 400 mg by mouth 3 (three) times daily.     ibuprofen (ADVIL,MOTRIN) 800 MG tablet Take 1 tablet (800 mg total) by mouth 3 (three)  times daily as needed for Pain. DO NOT TAKE WITH NAPROXEN OR ANY OTHER NSAID OR ASPIRIN.    olanzapine (ZYPREXA) 20 MG tablet Take 20 mg by mouth every evening.    olanzapine (ZYPREXA) 5 MG tablet Take 5 mg by mouth every morning.        Review of patient's allergies indicates:   Allergen Reactions    Haldol [haloperidol lactate] Other (See Comments)    Effexor xr [venlafaxine] Itching       Past Medical History:   Diagnosis Date    Anxiety     Back pain     Bipolar 1 disorder     Bipolar 1 disorder     Gastric bypass status for obesity     H/O bariatric surgery 10/30/2017    Heavy menstrual bleeding 10/30/2017    Iron deficiency anemia following bariatric surgery 10/30/2017    Microcytic anemia 10/30/2017     Past Surgical History:   Procedure Laterality Date     SECTION, CLASSIC      CHOLECYSTECTOMY      GASTRIC BYPASS      GASTRIC BYPASS       Family History     Problem Relation (Age of Onset)    Hypertension Paternal Uncle, Mother    No Known Problems Father        Social History Main Topics    Smoking status: Current Every Day Smoker     Packs/day: 0.50     Years: 19.00     Types: Vaping with nicotine    Smokeless tobacco: Never Used      Comment: patient has tried with chantrix    Alcohol use No    Drug use: No    Sexual activity: Yes     Birth control/ protection: None     Review of Systems   Unable to perform ROS: Mental status change     Objective:     Vital Signs (Most Recent):  Temp: 97.9 °F (36.6 °C) (17 1834)  Pulse: 83 (17)  Resp: 16 (17)  BP: (!) 99/54 (17)  SpO2: 98 % (17) Vital Signs (24h Range):  Temp:  [97.9 °F (36.6 °C)] 97.9 °F (36.6 °C)  Pulse:  [74-97] 83  Resp:  [16] 16  SpO2:  [98 %-100 %] 98 %  BP: ()/(54-77) 99/54     Weight: 75.3 kg (166 lb)  Body mass index is 28.49 kg/m².    Physical Exam   Constitutional: She is oriented to person, place, and time.   Somnolent; sedated     HENT:   Head: Normocephalic  and atraumatic.   Eyes: Right eye exhibits no discharge. Left eye exhibits no discharge. No scleral icterus.   Neck: Normal range of motion. Neck supple. Tracheal deviation present. No thyromegaly present.   Cardiovascular: Normal rate and regular rhythm.  Exam reveals no gallop.    No murmur heard.  Pulmonary/Chest: Effort normal and breath sounds normal. No respiratory distress.   Abdominal: Soft. Bowel sounds are normal. She exhibits no distension. There is no guarding.   Musculoskeletal: Normal range of motion. She exhibits no edema, tenderness or deformity.   Lymphadenopathy:     She has no cervical adenopathy.   Neurological: She is alert and oriented to person, place, and time.   Skin: Skin is warm. No rash noted. No erythema.   Psychiatric: She has a normal mood and affect. Her behavior is normal.   Vitals reviewed.      Significant Labs:  CBC:   Recent Labs  Lab 11/01/17 2015   WBC 4.70   RBC 4.09   HGB 8.5*   HCT 27.6*      MCV 68*   MCH 20.7*   MCHC 30.6*     BMP:   Recent Labs  Lab 11/01/17 2015         K 3.5      CO2 18*   BUN 12   CREATININE 0.7   CALCIUM 8.4*       Significant Diagnostics:  CT scan reviewed by me and night hawk findings evaluated    Pt with findings suggestive of mesenteric ischemia.  Twisting of the mesentery noted with possible vascular compromise.  Findings suggestive of pneumotosis.       Assessment/Plan:     * Volvulus    TO OR tonight for ex lap.  Verbal consent obtained from daughter.            VTE Risk Mitigation     None          Thank you for your consult. I will follow-up with patient. Please contact us if you have any additional questions.    Jose Randolph MD  General Surgery  [facility]

## 2017-11-02 NOTE — ASSESSMENT & PLAN NOTE
Chronic, H&H stable.  Reviewed notes from recent consult with Dr. Sumner.  Hgb up from prior s/p 1 unit RBC.  Patient reportedly left AMA prior to iron transfusions.  Monitor H&H closely.  F/u with Dr. Sumner upon discharge for continued outpatient iron infusion therapy.  Typed & screened.

## 2017-11-02 NOTE — BRIEF OP NOTE
[facility]  Brief Operative Note    SUMMARY     Surgery Date: 11/1/2017     Surgeon(s) and Role:     * Jose Randolph MD - Primary    Assisting Surgeon: None    Pre-op Diagnosis:  abdominal pain    Post-op Diagnosis:  Post-Op Diagnosis Codes:     * Abdominal pain [R10.9]    Procedure(s) (LRB):  EXPLORATORY-LAPAROTOMY (N/A)  REPAIR-HERNIA (N/A)   Drainage of chyloperitoneum with washout  Reduction and closure of internal hernia.     Anesthesia: General    Description of Procedure: Exploratory laparotomy.  Chyloperitoneum seen on entry into abdominal cavity.  Drained, suction and irrigated copiously.  No succus or enteric contents noted.  Pt noted to have a antecolic RY gastric bypass.  Pt with large internal hernia, Rai's type, with twisting and rotation of nearly entire small bowel posterior to the alimentary limb.  This was reduced and defect closed primarily.      Description of the findings of the procedure: see above.       Estimated Blood Loss: 75 cc's       Specimens:   Specimen (12h ago through future)    None

## 2017-11-02 NOTE — PROGRESS NOTES
POD 0 s/p ex lap and abdominal washout with reduction of internal hernia with primary closure    Pt notes abdominal pain.  She is resting comfortably prior to me entering room.  Denies n/v.  Biggest complaint is pain    Wt Readings from Last 3 Encounters:   11/01/17 75.3 kg (166 lb)   10/30/17 76.4 kg (168 lb 6.4 oz)   10/02/17 72 kg (158 lb 11.7 oz)     Temp Readings from Last 3 Encounters:   11/02/17 97.9 °F (36.6 °C) (Temporal)   10/30/17 98.3 °F (36.8 °C)   10/02/17 97.7 °F (36.5 °C) (Oral)     BP Readings from Last 3 Encounters:   11/02/17 120/76   10/30/17 112/73   10/02/17 110/68     Pulse Readings from Last 3 Encounters:   11/02/17 80   10/30/17 96   10/02/17 92     AAox3  CTA B  Sinus  Soft/nd/ no guarding   2+ pulses B  A    A/P: POD 0 s/p ex lap with reduction and primary repair of hernia  Okay to start clears  D/c graham   Check labs today

## 2017-11-02 NOTE — PROGRESS NOTES
Unable to finish admit due to patient being drowsy from sedation and unable to answer questions at this time. Will continue to monitor.

## 2017-11-02 NOTE — PROGRESS NOTES
[facility] Hospital Medicine  Progress Note    Patient Name: Amaris Kruger  MRN: [id number]  Patient Class: IP- Inpatient   Admission Date: 11/1/2017  Length of Stay: 0 days  Attending Physician: Namita Sagastume MD  Primary Care Provider: Primary Doctor No    Subjective:     Principal Problem:Volvulus    HPI:  Amaris Kruger is a 37 y.o. female with a hx of Bipolar 1 Disorder, back pain,  iron deficiency anemia following bariatric surgery and Schizophrenia.  She was admitted to the service of hospital medicine with abdominal pain and CT findings of volvulus.  She presented to the ED with complaints of epigastric pain.  She reports intermittent sharp, stabbing abdominal pain that began 4 days ago and acutely worsened 5 hours PTA.  No exacerbating or alleviating factors. She reports diarrhea x 4 day but denies melena and blood in stool. She noted decrease in appetite secondary to the sx and has not eaten today. She does reports some nausea and vomiting. Pt was seen at [facility] 2 days ago and received a blood transfusion and reported needing an iron infusion but left AMA. She denies abd pain at that time. She endorses chronic migraines and takes 800 Ibuprofen BID, Naproxen TID, and BC rangel 4-8 q.d. She denies a hx of IV drug use. Pt reports a gastric bypass procedure 12 years ago without any postop follow up. She reports a recent addition of Tegretol to her medications. Lives in a group home in [place].  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  Dr. Randolph (general surgery) was consulted from ED and is in route to take the patient for surgery.  She was agitated in ED and home psychiatric medications were administered.      Interval History: Stable overnight. Recently received dilaudid and is now asleep; looks comfortable.    Review of Systems   Unable to perform ROS: Other (asleep)      Objective:     Vital Signs (Most Recent):  Temp: 97.9 °F (36.6 °C) (11/02/17 0357)  Pulse: 80 (11/02/17 0357)  Resp: 15 (11/02/17 0357)  BP: 120/76 (11/02/17 0357)  SpO2: 100 % (11/02/17 0800) Vital Signs (24h Range):  Temp:  [97.9 °F (36.6 °C)-98.8 °F (37.1 °C)] 97.9 °F (36.6 °C)  Pulse:  [74-97] 80  Resp:  [15-18] 15  SpO2:  [98 %-100 %] 100 %  BP: ()/(54-82) 120/76     Weight: 75.3 kg (166 lb)  Body mass index is 28.49 kg/m².    Intake/Output Summary (Last 24 hours) at 11/02/17 1009  Last data filed at 11/02/17 0700   Gross per 24 hour   Intake          2064.58 ml   Output             3030 ml   Net          -965.42 ml      Physical Exam   Constitutional: She appears well-developed and well-nourished. She is sleeping. No distress.   HENT:   Head: Normocephalic and atraumatic.   Neck: Neck supple. No JVD present.   Cardiovascular: Normal rate, regular rhythm and normal heart sounds.    Pulmonary/Chest: Effort normal and breath sounds normal.   Abdominal: Soft. Bowel sounds are normal. She exhibits no distension. There is no tenderness.   Both drains with serosanguinous fluid- RUQ/ LLQ; incisions covered with bandages- clean, dry, and intact.   Musculoskeletal: She exhibits no edema.   Nursing note and vitals reviewed.      Significant Labs: All pertinent labs within the past 24 hours have been reviewed.    Significant Imaging: I have reviewed all pertinent imaging results/findings within the past 24 hours.    Assessment/Plan:      * Volvulus    S/P ex lap with reduction of Rai defect of the small bowel. POD #0  General surgery following. Stool softeners, mobilize, advance diet as tolerated.            Cigarette nicotine dependence without complication                Bipolar 1 disorder    Hx psychiatric hospitalizations-- currently at [facility] in [place].  Continue routine antipsychotics. Stable.         Iron deficiency anemia following bariatric surgery    Patient's anemia is currently controlled.    Current CBC reviewed-   Lab Results   Component Value Date    HGB 8.5 (L) 11/01/2017    HCT 27.6 (L) 11/01/2017    MCV 68 (L) 11/01/2017     11/01/2017     Monitor serial CBC and transfuse if patient becomes hemodynamically unstable, symptomatic or H/H drops below 7/21.               VTE Risk Mitigation         Ordered     enoxaparin injection 40 mg  Every 24 hours (non-standard times)     Route:  Subcutaneous        11/02/17 0238     Medium Risk of VTE  Once      11/02/17 0356        Namita Sagastume MD  Department of Hospital Medicine   [facility]

## 2017-11-02 NOTE — SUBJECTIVE & OBJECTIVE
Past Medical History:   Diagnosis Date    Anxiety     Back pain     Bipolar 1 disorder     Bipolar 1 disorder     Gastric bypass status for obesity     H/O bariatric surgery 10/30/2017    Heavy menstrual bleeding 10/30/2017    Iron deficiency anemia following bariatric surgery 10/30/2017    Microcytic anemia 10/30/2017       Past Surgical History:   Procedure Laterality Date     SECTION, CLASSIC      CHOLECYSTECTOMY      GASTRIC BYPASS      GASTRIC BYPASS         Review of patient's allergies indicates:   Allergen Reactions    Haldol [haloperidol lactate] Other (See Comments)    Effexor xr [venlafaxine] Itching       No current facility-administered medications on file prior to encounter.      Current Outpatient Prescriptions on File Prior to Encounter   Medication Sig    albuterol 90 mcg/actuation inhaler Inhale 2 puffs into the lungs every 6 (six) hours as needed for Wheezing. Rescue    atomoxetine (STRATTERA) 60 MG capsule Take 60 mg by mouth every morning.     carbamazepine (TEGRETOL) 100 mg chewable tablet Take 300 mg by mouth every evening.     carbamazepine (TEGRETOL) 200 mg tablet Take 200 mg by mouth every morning.     clonazePAM (KLONOPIN) 1 MG tablet Take 1 mg by mouth 3 (three) times daily.    doxepin (SINEQUAN) 150 MG Cap Take 150 mg by mouth nightly as needed.    gabapentin (NEURONTIN) 400 MG capsule Take 400 mg by mouth 3 (three) times daily.     ibuprofen (ADVIL,MOTRIN) 800 MG tablet Take 1 tablet (800 mg total) by mouth 3 (three) times daily as needed for Pain. DO NOT TAKE WITH NAPROXEN OR ANY OTHER NSAID OR ASPIRIN.    olanzapine (ZYPREXA) 20 MG tablet Take 20 mg by mouth every evening.    olanzapine (ZYPREXA) 5 MG tablet Take 5 mg by mouth every morning.      Family History     Problem Relation (Age of Onset)    Hypertension Paternal Uncle, Mother    No Known Problems Father        Social History Main Topics    Smoking status: Current Every Day Smoker      Packs/day: 0.50     Years: 19.00     Types: Vaping with nicotine    Smokeless tobacco: Never Used      Comment: patient has tried with chantrix    Alcohol use No    Drug use: No    Sexual activity: Yes     Birth control/ protection: None     Review of Systems   Constitutional: Positive for appetite change. Negative for activity change, chills and fever.   HENT: Positive for sore throat. Negative for congestion, postnasal drip, sinus pressure and trouble swallowing.    Eyes: Negative for photophobia and visual disturbance.   Respiratory: Negative for cough, shortness of breath and wheezing.    Cardiovascular: Negative for chest pain, palpitations and leg swelling.   Gastrointestinal: Positive for abdominal pain, diarrhea, nausea and vomiting. Negative for abdominal distention, blood in stool and constipation.   Genitourinary: Negative for difficulty urinating, dysuria and flank pain.   Musculoskeletal: Negative for arthralgias.   Skin: Negative for color change.   Neurological: Negative for dizziness, weakness and headaches.   Psychiatric/Behavioral: Negative for confusion. The patient is not nervous/anxious.      Objective:     Vital Signs (Most Recent):  Temp: 97.9 °F (36.6 °C) (11/01/17 1834)  Pulse: 74 (11/01/17 2145)  Resp: 16 (11/01/17 2145)  BP: 115/73 (11/01/17 2144)  SpO2: 100 % (11/01/17 2145) Vital Signs (24h Range):  Temp:  [97.9 °F (36.6 °C)] 97.9 °F (36.6 °C)  Pulse:  [74-97] 74  Resp:  [16] 16  SpO2:  [100 %] 100 %  BP: (108-115)/(65-77) 115/73     Weight: 75.3 kg (166 lb)  Body mass index is 28.49 kg/m².    Physical Exam   Constitutional: She is oriented to person, place, and time. She appears well-developed and well-nourished. No distress.   HENT:   Head: Normocephalic and atraumatic.   Eyes: Conjunctivae and EOM are normal. Pupils are equal, round, and reactive to light.   Neck: Normal range of motion. Neck supple. No thyromegaly present.   Cardiovascular: Normal rate, regular rhythm, normal  heart sounds and intact distal pulses.    Pulmonary/Chest: Effort normal and breath sounds normal. No respiratory distress.   Abdominal: Soft. Bowel sounds are normal. She exhibits no distension and no mass. There is tenderness (epigastric). There is no guarding.   Musculoskeletal: Normal range of motion. She exhibits no edema or tenderness.   Neurological: She is oriented to person, place, and time.   Drowsy after receiving pain medication.  Wakes to verbal stimuli, converses appropriately. Follows commands.    Skin: Skin is warm and dry. Capillary refill takes less than 2 seconds.   Psychiatric: She is slowed.        Significant Labs:   CBC:   Recent Labs  Lab 11/01/17 2015   WBC 4.70   HGB 8.5*   HCT 27.6*        CMP:   Recent Labs  Lab 11/01/17 2015      K 3.5      CO2 18*      BUN 12   CREATININE 0.7   CALCIUM 8.4*   PROT 6.5   ALBUMIN 3.1*   BILITOT 0.1   ALKPHOS 78   AST 32   ALT 39   ANIONGAP 11   EGFRNONAA >60     Coagulation:   Recent Labs  Lab 11/01/17 2015   INR 0.9   APTT 22.0       Significant Imaging:  CT A/P: 1. 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 Diffuse mesenteric edema. Mild pneumatosis without free or portal venous air. Surgical  changes as above.  2. Probable 4 cm left adnexal cystic lesion. Mild free fluid within the pelvis.

## 2017-11-02 NOTE — ASSESSMENT & PLAN NOTE
S/P ex lap with reduction of Rai defect of the small bowel. POD #0  General surgery following. Stool softeners, mobilize, advance diet as tolerated.

## 2017-11-02 NOTE — ANESTHESIA POSTPROCEDURE EVALUATION
"Anesthesia Post Evaluation    Patient: Amaris Kruger    Procedure(s) Performed: Procedure(s) (LRB):  EXPLORATORY-LAPAROTOMY (N/A)  REPAIR-HERNIA (N/A)    Final Anesthesia Type: general  Patient location during evaluation: PACU  Patient participation: Yes- Able to Participate  Level of consciousness: awake and alert  Post-procedure vital signs: reviewed and stable  Pain management: adequate  Airway patency: patent  PONV status at discharge: No PONV  Anesthetic complications: no      Cardiovascular status: blood pressure returned to baseline  Respiratory status: unassisted and nasal cannula  Hydration status: euvolemic  Follow-up not needed.        Visit Vitals  /76 (BP Location: Left arm, Patient Position: Lying)   Pulse 80   Temp 36.6 °C (97.9 °F) (Temporal)   Resp 15   Ht 5' 4" (1.626 m)   Wt 75.3 kg (166 lb)   LMP 08/16/2017 (Approximate)   SpO2 100%   BMI 28.49 kg/m²       Pain/Sam Score: Pain Assessment Performed: Yes (11/2/2017  6:00 AM)  Presence of Pain: non-verbal indicators absent (11/2/2017  6:00 AM)  Pain Rating Prior to Med Admin: 10 (11/1/2017 10:44 PM)  Sam Score: 8 (11/2/2017  3:40 AM)      "

## 2017-11-02 NOTE — PLAN OF CARE
Problem: Patient Care Overview  Goal: Plan of Care Review  Outcome: Revised  Pt is AAOx4.  Pt c/o pain, prn medication given, pt tolerated well.  Pt very anxious, prn Klonipin given, pt tolerated well.  Dressing to abd changed.  CRISTIAN drain monitored and emptied as needed,  Rivero removed, pt was able to void within the time frame three times.  VS stable, in NAD, pt remains afebrile.  Pt remains free from injury.  Bed in low position, wheels locked, call light within reach.  Pt verbalized understanding of POC.  Will continue to monitor.

## 2017-11-02 NOTE — ASSESSMENT & PLAN NOTE
NPO.  General surgeon in route to take patient for ex-lap.  Received IV zosyn in ED; IV hydration, pain control. Medically clear to proceed with surgery.

## 2017-11-02 NOTE — ANESTHESIA PREPROCEDURE EVALUATION
11/01/2017  Amaris Kruger is a 37 y.o., female.    Anesthesia Evaluation    I have reviewed the Patient Summary Reports.    I have reviewed the Nursing Notes.   I have reviewed the Medications.     Review of Systems  Anesthesia Hx:  Denies Family Hx of Anesthesia complications.   Denies Personal Hx of Anesthesia complications.   Social:  Smoker    Hematology/Oncology:         -- Anemia:   Pulmonary:   Asthma    Hepatic/GI:   Acute abdomen   Endocrine:  Endocrine Normal    Psych:   Psychiatric History Bipolar, IVDA         Physical Exam  General:  Malnutrition    Airway/Jaw/Neck:  Airway Findings: Mouth Opening: Normal General Airway Assessment: Adult  Mallampati: III  Improves to II with phonation.  TM Distance: Normal, at least 6 cm      Dental:  Dental Findings: Edentulous, Periodontal disease, Severe   Chest/Lungs:  Chest/Lungs Findings: Expiratory Wheezes, Mild     Heart/Vascular:  Heart Findings: Rate: Tachycardia  Rhythm: Regular Rhythm  Sounds: Normal        Mental Status:  Mental Status Findings:  Lethargic         Anesthesia Plan  Type of Anesthesia, risks & benefits discussed:  Anesthesia Type:  general  Patient's Preference:   Intra-op Monitoring Plan:   Intra-op Monitoring Plan Comments:   Post Op Pain Control Plan:   Post Op Pain Control Plan Comments:   Induction:   IV  Beta Blocker:  Patient is not currently on a Beta-Blocker (No further documentation required).       Informed Consent: Patient representative understands risks and agrees with Anesthesia plan.  Questions answered. Anesthesia consent signed with patient representative.  ASA Score: 2  emergent   Day of Surgery Review of History & Physical:    H&P update referred to the surgeon.         Ready For Surgery From Anesthesia Perspective.

## 2017-11-02 NOTE — SUBJECTIVE & OBJECTIVE
No current facility-administered medications on file prior to encounter.      Current Outpatient Prescriptions on File Prior to Encounter   Medication Sig    albuterol 90 mcg/actuation inhaler Inhale 2 puffs into the lungs every 6 (six) hours as needed for Wheezing. Rescue    atomoxetine (STRATTERA) 60 MG capsule Take 60 mg by mouth every morning.     carbamazepine (TEGRETOL) 100 mg chewable tablet Take 300 mg by mouth every evening.     carbamazepine (TEGRETOL) 200 mg tablet Take 200 mg by mouth every morning.     clonazePAM (KLONOPIN) 1 MG tablet Take 1 mg by mouth 3 (three) times daily.    doxepin (SINEQUAN) 150 MG Cap Take 150 mg by mouth nightly as needed.    gabapentin (NEURONTIN) 400 MG capsule Take 400 mg by mouth 3 (three) times daily.     ibuprofen (ADVIL,MOTRIN) 800 MG tablet Take 1 tablet (800 mg total) by mouth 3 (three) times daily as needed for Pain. DO NOT TAKE WITH NAPROXEN OR ANY OTHER NSAID OR ASPIRIN.    olanzapine (ZYPREXA) 20 MG tablet Take 20 mg by mouth every evening.    olanzapine (ZYPREXA) 5 MG tablet Take 5 mg by mouth every morning.        Review of patient's allergies indicates:   Allergen Reactions    Haldol [haloperidol lactate] Other (See Comments)    Effexor xr [venlafaxine] Itching       Past Medical History:   Diagnosis Date    Anxiety     Back pain     Bipolar 1 disorder     Bipolar 1 disorder     Gastric bypass status for obesity     H/O bariatric surgery 10/30/2017    Heavy menstrual bleeding 10/30/2017    Iron deficiency anemia following bariatric surgery 10/30/2017    Microcytic anemia 10/30/2017     Past Surgical History:   Procedure Laterality Date     SECTION, CLASSIC      CHOLECYSTECTOMY      GASTRIC BYPASS      GASTRIC BYPASS       Family History     Problem Relation (Age of Onset)    Hypertension Paternal Uncle, Mother    No Known Problems Father        Social History Main Topics    Smoking status: Current Every Day Smoker     Packs/day:  0.50     Years: 19.00     Types: Vaping with nicotine    Smokeless tobacco: Never Used      Comment: patient has tried with chantrix    Alcohol use No    Drug use: No    Sexual activity: Yes     Birth control/ protection: None     Review of Systems   Unable to perform ROS: Mental status change     Objective:     Vital Signs (Most Recent):  Temp: 97.9 °F (36.6 °C) (11/01/17 1834)  Pulse: 83 (11/01/17 2333)  Resp: 16 (11/01/17 2333)  BP: (!) 99/54 (11/01/17 2333)  SpO2: 98 % (11/01/17 2333) Vital Signs (24h Range):  Temp:  [97.9 °F (36.6 °C)] 97.9 °F (36.6 °C)  Pulse:  [74-97] 83  Resp:  [16] 16  SpO2:  [98 %-100 %] 98 %  BP: ()/(54-77) 99/54     Weight: 75.3 kg (166 lb)  Body mass index is 28.49 kg/m².    Physical Exam   Constitutional: She is oriented to person, place, and time.   Somnolent; sedated     HENT:   Head: Normocephalic and atraumatic.   Eyes: Right eye exhibits no discharge. Left eye exhibits no discharge. No scleral icterus.   Neck: Normal range of motion. Neck supple. Tracheal deviation present. No thyromegaly present.   Cardiovascular: Normal rate and regular rhythm.  Exam reveals no gallop.    No murmur heard.  Pulmonary/Chest: Effort normal and breath sounds normal. No respiratory distress.   Abdominal: Soft. Bowel sounds are normal. She exhibits no distension. There is no guarding.   Musculoskeletal: Normal range of motion. She exhibits no edema, tenderness or deformity.   Lymphadenopathy:     She has no cervical adenopathy.   Neurological: She is alert and oriented to person, place, and time.   Skin: Skin is warm. No rash noted. No erythema.   Psychiatric: She has a normal mood and affect. Her behavior is normal.   Vitals reviewed.      Significant Labs:  CBC:   Recent Labs  Lab 11/01/17 2015   WBC 4.70   RBC 4.09   HGB 8.5*   HCT 27.6*      MCV 68*   MCH 20.7*   MCHC 30.6*     BMP:   Recent Labs  Lab 11/01/17 2015         K 3.5      CO2 18*   BUN 12    CREATININE 0.7   CALCIUM 8.4*       Significant Diagnostics:  CT scan reviewed by me and night hawk findings evaluated    Pt with findings suggestive of mesenteric ischemia.  Twisting of the mesentery noted with possible vascular compromise.  Findings suggestive of pneumotosis.

## 2017-11-02 NOTE — ASSESSMENT & PLAN NOTE
Patient's anemia is currently controlled.   Current CBC reviewed-   Lab Results   Component Value Date    HGB 8.5 (L) 11/01/2017    HCT 27.6 (L) 11/01/2017    MCV 68 (L) 11/01/2017     11/01/2017     Monitor serial CBC and transfuse if patient becomes hemodynamically unstable, symptomatic or H/H drops below 7/21.

## 2017-11-02 NOTE — HPI
Amaris Kruger is a 37 y.o. female with a hx of Bipolar 1 Disorder, back pain,  iron deficiency anemia following bariatric surgery and Schizophrenia.  She was admitted to the service of hospital medicine with abdominal pain and CT findings of volvulus.  She presented to the ED with complaints of epigastric pain.  She reports intermittent sharp, stabbing abdominal pain that began 4 days ago and acutely worsened 5 hours PTA.  No exacerbating or alleviating factors. She reports diarrhea x 4 day but denies melena and blood in stool. She noted decrease in appetite secondary to the sx and has not eaten today. She does reports some nausea and vomiting. Pt was seen at [facility] 2 days ago and received a blood transfusion and reported needing an iron infusion but left AMA. She denies abd pain at that time. She endorses chronic migraines and takes 800 Ibuprofen BID, Naproxen TID, and BC rangel 4-8 q.d. She denies a hx of IV drug use. Pt reports a gastric bypass procedure 12 years ago without any postop follow up. She reports a recent addition of Tegretol to her medications. Lives in a group home in [place].  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  Dr. Randolph (general surgery) was consulted from ED and is in route to take the patient for surgery.  She was agitated in ED and home psychiatric medications were administered.

## 2017-11-02 NOTE — ED NOTES
speaking with pts mother via phone to explain pts condition; pt is very sedated/lethargic at this time.

## 2017-11-02 NOTE — ASSESSMENT & PLAN NOTE
Hx psychiatric hospitalizations-- currently at [facility] in [place].  Continue routine antipsychotics as appropriate post-operatively as per outpatient regimen and monitor closely.

## 2017-11-02 NOTE — ED PROVIDER NOTES
Encounter Date: 2017    SCRIBE #1 NOTE: I, Maria Del Carmen Clemente am scribing for, and in the presence of, Dr. Hernandez .       History     Chief Complaint   Patient presents with    Abdominal Pain     was seen at [facility] on monday and received a blood transfusion. needed an iron infusion as well but left AMA       2017 7:25 PM     Chief complaint: Abd pain       Amaris Kruger is a 37 y.o. female with a hx of Bipolar 1 Disorder, back pain, Microcytic anemia, iron deficiency anemia following bariatric surgery and Schizophrenia who presents to the ED with complaints of abd pain x 5 hours. She reports diarrhea x 4 day but denies melena and blood in stool. She noted decrease in appetite secondary to the sx and has not eaten today. Pt was seen at [facility] 2 days ago and received a blood transfusion needed an iron infusion but left AMA. She denies abd pain during the blood transfusion. She endorses chronic migraines and takes 800 Ibuprofen BID, Naproxen TIB, and BC rangel 4-8 q.d. She denies a hx of IV drug use. Pt reports a gastric bypass procedure 12 years ago without any postop follow up. She reports a recent addition of Tegretol to her medications. Allergens include Haldol and Effexor.       The history is provided by the patient.     Review of patient's allergies indicates:   Allergen Reactions    Haldol [haloperidol lactate] Other (See Comments)    Effexor xr [venlafaxine] Itching     Past Medical History:   Diagnosis Date    Anxiety     Back pain     Bipolar 1 disorder     Bipolar 1 disorder     Gastric bypass status for obesity     H/O bariatric surgery 10/30/2017    Heavy menstrual bleeding 10/30/2017    Iron deficiency anemia following bariatric surgery 10/30/2017    Microcytic anemia 10/30/2017     Past Surgical History:   Procedure Laterality Date     SECTION, CLASSIC      CHOLECYSTECTOMY      GASTRIC BYPASS      GASTRIC BYPASS       Family History   Problem Relation Age of Onset     Hypertension Paternal Uncle      Social History   Substance Use Topics    Smoking status: Current Every Day Smoker     Packs/day: 0.50     Years: 19.00     Types: Vaping with nicotine    Smokeless tobacco: Never Used      Comment: patient has tried with chantrix    Alcohol use No     Review of Systems   Constitutional: Positive for appetite change. Negative for fever.   HENT: Negative for sore throat.    Eyes: Negative for pain.   Respiratory: Negative for shortness of breath.    Cardiovascular: Negative for chest pain.   Gastrointestinal: Positive for abdominal pain and diarrhea. Negative for nausea.   Genitourinary: Negative for dysuria.   Musculoskeletal: Negative for back pain.   Skin: Negative for rash.   Neurological: Negative for weakness.   Hematological: Does not bruise/bleed easily.       Physical Exam     Initial Vitals [11/01/17 1834]   BP Pulse Resp Temp SpO2   111/77 97 16 97.9 °F (36.6 °C) 100 %      MAP       88.33         Physical Exam    Nursing note and vitals reviewed.  Constitutional: She appears well-developed.   HENT:   Head: Normocephalic and atraumatic.   Eyes: EOM are normal. Pupils are equal, round, and reactive to light.   Neck: Neck supple.   Cardiovascular: Normal rate, regular rhythm, normal heart sounds and intact distal pulses. Exam reveals no gallop and no friction rub.    No murmur heard.  Pulmonary/Chest: Breath sounds normal. No respiratory distress. She has no decreased breath sounds. She has no wheezes. She has no rhonchi. She has no rales.   Abdominal: Soft. Bowel sounds are normal. There is tenderness (Mild) in the epigastric area.   Musculoskeletal: Normal range of motion.   Neurological: She is alert and oriented to person, place, and time.   Skin: Skin is warm and dry.   Psychiatric:   Mood is slightly paranoid. Lethargic affect.          ED Course   Critical Care  Performed by: CHELSEY EATON.  Authorized by: CHELSEY EATON   Direct patient critical care time: 15  minutes  Additional history critical care time: 5 minutes  Ordering / reviewing critical care time: 10 minutes  Documentation critical care time: 5 minutes  Consulting other physicians critical care time: 15 minutes  Consult with family critical care time: 10 minutes  Total critical care time (exclusive of procedural time) : 60 minutes  Critical care was necessary to treat or prevent imminent or life-threatening deterioration of the following conditions: mid gut volvulus requiring emergent surgery.  Critical care was time spent personally by me on the following activities: discussions with consultants, evaluation of patient's response to treatment, examination of patient, ordering and performing treatments and interventions, ordering and review of laboratory studies, ordering and review of radiographic studies and re-evaluation of patient's condition.        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            Scribe Attestation:   Scribe #1: I performed the above scribed service and the documentation accurately describes the services I performed. I attest to the accuracy of the note.    I, Dr. Kamar Hernandez personally performed the services described in this documentation. All medical record entries made by the scribe were at my direction and in my presence.  I have reviewed the chart and agree that the record reflects my personal performance and is accurate and complete. Kamar Hernandez MD.  6:55 AM 11/02/2017         ED Course      Clinical Impression:     1. Midgut volvulus    2. Abdominal pain, unspecified abdominal location    3. Volvulus                               Kamar Hernandez MD  11/02/17 0722

## 2017-11-02 NOTE — PLAN OF CARE
Problem: Fall Risk (Adult)  Intervention: Safety Precautions  Received report from Yara. Patient still drowsy from sedation but arousable. Midline incision to ABD dressing reinforced CDI. Greg drains intact. SCDs in place. 02 at 2l/min.   IV fluids infusing as ordered Md. Rivero catheter in place draining clear yellow urine freely. Hourly rounding on patient to promote safety. Safety maintained throughout the shift.Patient positions and repositions self  independently. No Skin break down.   Bed locked and in lowest position. Call light in reach. Side rails up x2. NON skid socks on when OOB. Patient remained free of falls/ trauma.  Will continue to monitor.

## 2017-11-02 NOTE — HPI
36 yo F presenting to the hospital with abdominal pain. Pt with medical history significant for both schizophrenia and bipolar disorder.  Pt reports having had lap Ry gastric bypass in 2002.  Pt reportedly began having vague abdominal pain 5 days ago.  She was seen at an outside facility with anemia and reportedly left AMA.  Pt reported to this facility this evening with worsening abdominal pain and discomfort.  CT scan performed in the ER demonstrating findings suggestive of volvulus with vascular compromise.  Pt received her psychiatric medications in the ER and was somnolent on my exam.  Pt reportedly lives in a group home.

## 2017-11-02 NOTE — H&P
[facility] Hospital Medicine  History & Physical    Patient Name: Amaris Kruger  MRN: [id number]  Admission Date: 11/1/2017  Attending Physician: Kamar Hernandez MD   Primary Care Provider: Primary Doctor No         Patient information was obtained from patient and ER records.     Subjective:     Principal Problem:Volvulus    Chief Complaint:   Chief Complaint   Patient presents with    Abdominal Pain     was seen at [facility] on monday and received a blood transfusion. needed an iron infusion as well but left AMA        HPI: Amaris Kruger is a 37 y.o. female with a hx of Bipolar 1 Disorder, back pain,  iron deficiency anemia following bariatric surgery and Schizophrenia.  She was admitted to the service of hospital medicine with abdominal pain and CT findings of volvulus.  She presented to the ED with complaints of epigastric pain.  She reports intermittent sharp, stabbing abdominal pain that began 4 days ago and acutely worsened 5 hours PTA.  No exacerbating or alleviating factors. She reports diarrhea x 4 day but denies melena and blood in stool. She noted decrease in appetite secondary to the sx and has not eaten today. She does reports some nausea and vomiting. Pt was seen at [facility] 2 days ago and received a blood transfusion and reported needing an iron infusion but left AMA. She denies abd pain at that time. She endorses chronic migraines and takes 800 Ibuprofen BID, Naproxen TID, and BC rangel 4-8 q.d. She denies a hx of IV drug use. Pt reports a gastric bypass procedure 12 years ago without any postop follow up. She reports a recent addition of Tegretol to her medications. Lives in a group home in [place].  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  Dr. Randolph (general surgery) was consulted from ED and is in route to take the patient for surgery.  She was agitated in ED and home  psychiatric medications were administered.    Past Medical History:   Diagnosis Date    Anxiety     Back pain     Bipolar 1 disorder     Bipolar 1 disorder     Gastric bypass status for obesity     H/O bariatric surgery 10/30/2017    Heavy menstrual bleeding 10/30/2017    Iron deficiency anemia following bariatric surgery 10/30/2017    Microcytic anemia 10/30/2017       Past Surgical History:   Procedure Laterality Date     SECTION, CLASSIC      CHOLECYSTECTOMY      GASTRIC BYPASS      GASTRIC BYPASS         Review of patient's allergies indicates:   Allergen Reactions    Haldol [haloperidol lactate] Other (See Comments)    Effexor xr [venlafaxine] Itching       No current facility-administered medications on file prior to encounter.      Current Outpatient Prescriptions on File Prior to Encounter   Medication Sig    albuterol 90 mcg/actuation inhaler Inhale 2 puffs into the lungs every 6 (six) hours as needed for Wheezing. Rescue    atomoxetine (STRATTERA) 60 MG capsule Take 60 mg by mouth every morning.     carbamazepine (TEGRETOL) 100 mg chewable tablet Take 300 mg by mouth every evening.     carbamazepine (TEGRETOL) 200 mg tablet Take 200 mg by mouth every morning.     clonazePAM (KLONOPIN) 1 MG tablet Take 1 mg by mouth 3 (three) times daily.    doxepin (SINEQUAN) 150 MG Cap Take 150 mg by mouth nightly as needed.    gabapentin (NEURONTIN) 400 MG capsule Take 400 mg by mouth 3 (three) times daily.     ibuprofen (ADVIL,MOTRIN) 800 MG tablet Take 1 tablet (800 mg total) by mouth 3 (three) times daily as needed for Pain. DO NOT TAKE WITH NAPROXEN OR ANY OTHER NSAID OR ASPIRIN.    olanzapine (ZYPREXA) 20 MG tablet Take 20 mg by mouth every evening.    olanzapine (ZYPREXA) 5 MG tablet Take 5 mg by mouth every morning.      Family History     Problem Relation (Age of Onset)    Hypertension Paternal Uncle, Mother    No Known Problems Father        Social History Main Topics    Smoking  status: Current Every Day Smoker     Packs/day: 0.50     Years: 19.00     Types: Vaping with nicotine    Smokeless tobacco: Never Used      Comment: patient has tried with chantrix    Alcohol use No    Drug use: No    Sexual activity: Yes     Birth control/ protection: None     Review of Systems   Constitutional: Positive for appetite change. Negative for activity change, chills and fever.   HENT: Positive for sore throat. Negative for congestion, postnasal drip, sinus pressure and trouble swallowing.    Eyes: Negative for photophobia and visual disturbance.   Respiratory: Negative for cough, shortness of breath and wheezing.    Cardiovascular: Negative for chest pain, palpitations and leg swelling.   Gastrointestinal: Positive for abdominal pain, diarrhea, nausea and vomiting. Negative for abdominal distention, blood in stool and constipation.   Genitourinary: Negative for difficulty urinating, dysuria and flank pain.   Musculoskeletal: Negative for arthralgias.   Skin: Negative for color change.   Neurological: Negative for dizziness, weakness and headaches.   Psychiatric/Behavioral: Negative for confusion. The patient is not nervous/anxious.      Objective:     Vital Signs (Most Recent):  Temp: 97.9 °F (36.6 °C) (11/01/17 1834)  Pulse: 74 (11/01/17 2145)  Resp: 16 (11/01/17 2145)  BP: 115/73 (11/01/17 2144)  SpO2: 100 % (11/01/17 2145) Vital Signs (24h Range):  Temp:  [97.9 °F (36.6 °C)] 97.9 °F (36.6 °C)  Pulse:  [74-97] 74  Resp:  [16] 16  SpO2:  [100 %] 100 %  BP: (108-115)/(65-77) 115/73     Weight: 75.3 kg (166 lb)  Body mass index is 28.49 kg/m².    Physical Exam   Constitutional: She is oriented to person, place, and time. She appears well-developed and well-nourished. No distress.   HENT:   Head: Normocephalic and atraumatic.   Eyes: Conjunctivae and EOM are normal. Pupils are equal, round, and reactive to light.   Neck: Normal range of motion. Neck supple. No thyromegaly present.   Cardiovascular:  Normal rate, regular rhythm, normal heart sounds and intact distal pulses.    Pulmonary/Chest: Effort normal and breath sounds normal. No respiratory distress.   Abdominal: Soft. Bowel sounds are normal. She exhibits no distension and no mass. There is tenderness (epigastric). There is no guarding.   Musculoskeletal: Normal range of motion. She exhibits no edema or tenderness.   Neurological: She is oriented to person, place, and time.   Drowsy after receiving pain medication.  Wakes to verbal stimuli, converses appropriately. Follows commands.    Skin: Skin is warm and dry. Capillary refill takes less than 2 seconds.   Psychiatric: She is slowed.        Significant Labs:   CBC:   Recent Labs  Lab 11/01/17 2015   WBC 4.70   HGB 8.5*   HCT 27.6*        CMP:   Recent Labs  Lab 11/01/17 2015      K 3.5      CO2 18*      BUN 12   CREATININE 0.7   CALCIUM 8.4*   PROT 6.5   ALBUMIN 3.1*   BILITOT 0.1   ALKPHOS 78   AST 32   ALT 39   ANIONGAP 11   EGFRNONAA >60     Coagulation:   Recent Labs  Lab 11/01/17 2015   INR 0.9   APTT 22.0       Significant Imaging:  CT A/P: 1. 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 Diffuse mesenteric edema. Mild pneumatosis without free or portal venous air. Surgical  changes as above.  2. Probable 4 cm left adnexal cystic lesion. Mild free fluid within the pelvis.    Assessment/Plan:     * Volvulus    NPO.  General surgeon in route to take patient for ex-lap.  Received IV zosyn in ED; IV hydration, pain control. Medically clear to proceed with surgery.          Iron deficiency anemia following bariatric surgery    Chronic, H&H stable.  Reviewed notes from recent consult with Dr. Sumner.  Hgb up from prior s/p 1 unit RBC.  Patient reportedly left AMA prior to iron transfusions.  Monitor H&H closely.  F/u with Dr. Sumner upon discharge for continued outpatient iron infusion therapy.  Typed &  screened.           Bipolar 1 disorder    Hx psychiatric hospitalizations-- currently at [facility] in [place].  Continue routine antipsychotics as appropriate post-operatively as per outpatient regimen and monitor closely.          Cigarette nicotine dependence without complication    Health hazards associated with cigarette smoking were reviewed with patient and cessation was encouraged. Nicotine replacement and counseling options were discussed.  Spent 3 minutes counseling on cessation, patient will require further counseling once more alert.              Did attempt to phone mother and father x 2 without success.  Nursing staff in ED informed group home  that patient planned for surgery.    VTE Risk Mitigation     Moderate: SCD/TEDs.             Chioma Naik NP  Department of Hospital Medicine   [facility]

## 2017-11-02 NOTE — ASSESSMENT & PLAN NOTE
Hx psychiatric hospitalizations-- currently at [facility] in [place].  Continue routine antipsychotics. Stable.

## 2017-11-02 NOTE — SUBJECTIVE & OBJECTIVE
Interval History: Stable overnight. Recently received dilaudid and is now asleep; looks comfortable.    Review of Systems   Unable to perform ROS: Other (asleep)     Objective:     Vital Signs (Most Recent):  Temp: 97.9 °F (36.6 °C) (11/02/17 0357)  Pulse: 80 (11/02/17 0357)  Resp: 15 (11/02/17 0357)  BP: 120/76 (11/02/17 0357)  SpO2: 100 % (11/02/17 0800) Vital Signs (24h Range):  Temp:  [97.9 °F (36.6 °C)-98.8 °F (37.1 °C)] 97.9 °F (36.6 °C)  Pulse:  [74-97] 80  Resp:  [15-18] 15  SpO2:  [98 %-100 %] 100 %  BP: ()/(54-82) 120/76     Weight: 75.3 kg (166 lb)  Body mass index is 28.49 kg/m².    Intake/Output Summary (Last 24 hours) at 11/02/17 1009  Last data filed at 11/02/17 0700   Gross per 24 hour   Intake          2064.58 ml   Output             3030 ml   Net          -965.42 ml      Physical Exam   Constitutional: She appears well-developed and well-nourished. She is sleeping. No distress.   HENT:   Head: Normocephalic and atraumatic.   Neck: Neck supple. No JVD present.   Cardiovascular: Normal rate, regular rhythm and normal heart sounds.    Pulmonary/Chest: Effort normal and breath sounds normal.   Abdominal: Soft. Bowel sounds are normal. She exhibits no distension. There is no tenderness.   Both drains with serosanguinous fluid- RUQ/ LLQ; incisions covered with bandages- clean, dry, and intact.   Musculoskeletal: She exhibits no edema.   Nursing note and vitals reviewed.      Significant Labs: All pertinent labs within the past 24 hours have been reviewed.    Significant Imaging: I have reviewed all pertinent imaging results/findings within the past 24 hours.

## 2017-11-02 NOTE — PLAN OF CARE
Cm completed the assessment with pt, without difficulties.  Pt sitting up in bed, she informed me she lives at [facility].  She has her - Alex Bunch - [phone].  Pt is self care, the facility provides transportation services.  Pt denies diabetes, dialysis and coumadin.  Pt works full time at [employer].  Pt's PCP is Dr. Shaq Carvajal in [place].   Insurance verified as Medicare.  Disposition: Pt will return to [facility] on discharge.  No needs identified at this time.       11/02/17 0935   Discharge Assessment   Assessment Type Discharge Planning Assessment   Confirmed/corrected address and phone number on facesheet? Yes   Assessment information obtained from? Patient   Prior to hospitilization cognitive status: Alert/Oriented   Prior to hospitalization functional status: Independent   Current cognitive status: Alert/Oriented   Current Functional Status: Independent   Facility Arrived From: [facility]: [facility]   Lives With other (see comments)  (Personal Care Home)   Able to Return to Prior Arrangements yes   Is patient able to care for self after discharge? Yes   Who are your caregiver(s) and their phone number(s)? Alex Bunch ([facility] ) [phone]/ Mother - Zara Sharma - [phone]   Patient's perception of discharge disposition group home   Readmission Within The Last 30 Days no previous admission in last 30 days   Patient currently being followed by outpatient case management? Yes   If yes, name of outpatient case management following: other (comments)  ([facility]- (Department of Children and Family Services) -[phone])   Patient currently receives any other outside agency services? No   Equipment Currently Used at Home none   Do you have any problems affording any of your prescribed medications? No   Is the patient taking medications as prescribed? yes   Does the patient have transportation home? Yes   Transportation Available  agency transportation   Dialysis Name and Scheduled days NA   Does the patient receive services at the [facility]madin Clinic? No   Discharge Plan A Group Home   Discharge Plan B Group home   Patient/Family In Agreement With Plan yes

## 2017-11-02 NOTE — PROGRESS NOTES
Pt c/o pain, prn Toradol given to pt initially.  Pt got angry, cursing at nurses/ students.  Pt c/o that Toradol does not work.  I pulled the Dilaudid, but accidentally wasted whole vial into trash.  We wasted entire vial in Pyxis and toribio new vial from pyxis.  Administered to pt.  Will continue to monitor.

## 2017-11-02 NOTE — ASSESSMENT & PLAN NOTE
Health hazards associated with cigarette smoking were reviewed with patient and cessation was encouraged. Nicotine replacement and counseling options were discussed.  Spent 3 minutes counseling on cessation, patient will require further counseling once more alert.

## 2017-11-02 NOTE — PLAN OF CARE
Pt drowsy but arousable, pt sleeping in between care, vs stable, no indicators of pain noted, dressing to abdomen cdi, drains x 2 to abdomen, drain 1 output of 60 ml, drain 2 output 20 ml, dressing to drain 1 reinforced, IV fluids infusing, pt is NPO, bilateral SCDs on, graham catheter in place and flowing freely. OK per Dr. Vanegas to send pt to the floor. Pt transported from PACU via stretcher to room 314. Pt transferred from stretcher to bed. Personal belongings at the bedside. Dr. Randolph called pt's mother and updated her on pt. Call light within reach. Report given to ALLISON Muñoz.

## 2017-11-02 NOTE — OP NOTE
DATE OF PROCEDURE:  11/02/2017    STAFF SURGEON:  Jose Randolph M.D.    PREOPERATIVE DIAGNOSIS:  Internal hernia.    POSTOPERATIVE DIAGNOSIS:  Internal hernia.    PROCEDURES:  1.  Exploratory laparotomy.  2.  Drainage of abdominal fluid collection.  3.  Reduction and primary repair of internal hernia.    ANESTHESIA:  General endotracheal anesthesia.    INDICATION FOR PROCEDURE:  This is a 37-year-old female who presented to the ER   with increasing abdominal pain over the last 4 to 5 days.  The patient has a   history of gastric bypass, had a CT scan done in the ER demonstrating findings   consistent with volvulus and with potential vascular compromise.  Given these   findings, she was scheduled for emergent exploratory laparotomy.    DESCRIPTION OF PROCEDURE:  Following signing of informed consent, she received   preoperative IV antibiotics, taken to the OR and placed in supine position.    SCDs were placed.  General anesthesia was administered without event.  Rivero   catheter was inserted.  Abdomen was prepped and draped in standard fashion and   appropriate timeout procedure performed.  Having performed timeout procedure, I   made a generous midline incision, carried down through deep dermal tissue, down   to the fascia.  Fascia was then sharply divided and abdominal cavity was entered   without event.  Upon entering the abdominal cavity, I immediately noticed   significant amount of white murky fluid consistent with chyloperitoneum.    Cultures were taken and this was irrigated and suctioned out of the abdominal   cavity without event.  Fluid was present along bilateral pericolic gutters   extending into the left upper quadrant and also down into the pelvis.  Having   irrigated this, I then evaluated the abdominal cavity.  It should be noted that   upon irrigating and debriding this, there was no evidence of succus or any   enteric contents.  There was some small bowel dilatation, but it should be noted   there  were no inflammatory of the mesentery or significant inflammation of the small bowel   or the colon in this initial evaluation.  There was significant lymphadenopathy which is presumed source of chyloperitoneum.   Identified the jejunojejunal   anastomosis as well as the anastomosis of the biliary limb.  The patient has an   apparent antecolic Mynor-en-Y gastric bypass and posterior to this antecolic   between the alimentary limb and the transverse colon, there had been herniation   consistent with Knowles defect with small bowel twisting behind this nearly the   entirety of the small bowel.  This was reduced with relative ease and small   bowel ran in all limbs.  There were no other blockages or signs of obstruction.    I then closed the Rai defect primarily and irrigated and ensured adequate   hemostasis.  Given the presence of chyloperitoneum, I did place drains from each   side, one going into the pelvis, one going into the upper abdomen.  I injected   Exparel mixed with Marcaine.  I closed the fascia with running loop PDS suture.    Skin incisions were closed with 4-0 Monocryl.  The patient was extubated, taken   to Recovery Room in stable condition.  There were no immediate complications.    Blood loss was approximately 50 mL.      ANG/IN  dd: 11/02/2017 02:16:11 (CDT)  td: 11/02/2017 08:13:11 (MAGEN)  Doc ID   #[id number]  Job ID #[id number]    CC:

## 2017-11-03 VITALS
HEART RATE: 87 BPM | TEMPERATURE: 99 F | OXYGEN SATURATION: 96 % | RESPIRATION RATE: 19 BRPM | BODY MASS INDEX: 28.34 KG/M2 | HEIGHT: 64 IN | DIASTOLIC BLOOD PRESSURE: 60 MMHG | SYSTOLIC BLOOD PRESSURE: 109 MMHG | WEIGHT: 166 LBS

## 2017-11-03 LAB
ANISOCYTOSIS BLD QL SMEAR: ABNORMAL
BASOPHILS # BLD AUTO: ABNORMAL K/UL
BASOPHILS NFR BLD: 1 %
DIFFERENTIAL METHOD: ABNORMAL
EOSINOPHIL # BLD AUTO: ABNORMAL K/UL
EOSINOPHIL NFR BLD: 2 %
ERYTHROCYTE [DISTWIDTH] IN BLOOD BY AUTOMATED COUNT: 23.1 %
HCT VFR BLD AUTO: 23.1 %
HGB BLD-MCNC: 7.2 G/DL
HYPOCHROMIA BLD QL SMEAR: ABNORMAL
LYMPHOCYTES # BLD AUTO: ABNORMAL K/UL
LYMPHOCYTES NFR BLD: 22 %
MCH RBC QN AUTO: 21 PG
MCHC RBC AUTO-ENTMCNC: 31.1 G/DL
MCV RBC AUTO: 68 FL
MONOCYTES # BLD AUTO: ABNORMAL K/UL
MONOCYTES NFR BLD: 3 %
NEUTROPHILS NFR BLD: 70 %
NEUTS BAND NFR BLD MANUAL: 2 %
PLATELET # BLD AUTO: 268 K/UL
PLATELET BLD QL SMEAR: ABNORMAL
PMV BLD AUTO: 8.1 FL
POIKILOCYTOSIS BLD QL SMEAR: SLIGHT
POLYCHROMASIA BLD QL SMEAR: ABNORMAL
RBC # BLD AUTO: 3.42 M/UL
WBC # BLD AUTO: 4.4 K/UL

## 2017-11-03 PROCEDURE — 25000003 PHARM REV CODE 250: Performed by: NURSE PRACTITIONER

## 2017-11-03 PROCEDURE — 94760 N-INVAS EAR/PLS OXIMETRY 1: CPT

## 2017-11-03 PROCEDURE — 63600175 PHARM REV CODE 636 W HCPCS: Performed by: NURSE PRACTITIONER

## 2017-11-03 PROCEDURE — 36415 COLL VENOUS BLD VENIPUNCTURE: CPT

## 2017-11-03 PROCEDURE — 85007 BL SMEAR W/DIFF WBC COUNT: CPT

## 2017-11-03 PROCEDURE — 25000003 PHARM REV CODE 250: Performed by: FAMILY MEDICINE

## 2017-11-03 PROCEDURE — 25000003 PHARM REV CODE 250: Performed by: SURGERY

## 2017-11-03 PROCEDURE — 63600175 PHARM REV CODE 636 W HCPCS: Performed by: FAMILY MEDICINE

## 2017-11-03 PROCEDURE — 94761 N-INVAS EAR/PLS OXIMETRY MLT: CPT

## 2017-11-03 PROCEDURE — 63600175 PHARM REV CODE 636 W HCPCS: Performed by: SURGERY

## 2017-11-03 PROCEDURE — 85027 COMPLETE CBC AUTOMATED: CPT

## 2017-11-03 RX ORDER — OXYCODONE AND ACETAMINOPHEN 10; 325 MG/1; MG/1
1 TABLET ORAL EVERY 4 HOURS PRN
Status: DISCONTINUED | OUTPATIENT
Start: 2017-11-03 | End: 2017-11-03 | Stop reason: HOSPADM

## 2017-11-03 RX ORDER — DICYCLOMINE HYDROCHLORIDE 10 MG/1
20 CAPSULE ORAL EVERY 6 HOURS PRN
Qty: 30 CAPSULE | Refills: 0 | Status: SHIPPED | OUTPATIENT
Start: 2017-11-03 | End: 2017-12-03

## 2017-11-03 RX ORDER — DICYCLOMINE HYDROCHLORIDE 10 MG/1
20 CAPSULE ORAL EVERY 6 HOURS PRN
Status: DISCONTINUED | OUTPATIENT
Start: 2017-11-03 | End: 2017-11-03 | Stop reason: HOSPADM

## 2017-11-03 RX ORDER — SIMETHICONE 80 MG
1 TABLET,CHEWABLE ORAL 3 TIMES DAILY PRN
Status: DISCONTINUED | OUTPATIENT
Start: 2017-11-03 | End: 2017-11-03 | Stop reason: HOSPADM

## 2017-11-03 RX ORDER — CARBAMAZEPINE 200 MG/1
400 TABLET ORAL NIGHTLY
Status: DISCONTINUED | OUTPATIENT
Start: 2017-11-03 | End: 2017-11-03

## 2017-11-03 RX ORDER — AMOXICILLIN 250 MG
2 CAPSULE ORAL 2 TIMES DAILY
COMMUNITY
Start: 2017-11-03

## 2017-11-03 RX ORDER — MUPIROCIN 20 MG/G
1 OINTMENT TOPICAL 2 TIMES DAILY
Qty: 1 TUBE | Refills: 0 | Status: SHIPPED | OUTPATIENT
Start: 2017-11-03 | End: 2017-11-07

## 2017-11-03 RX ORDER — HYDROCODONE BITARTRATE AND ACETAMINOPHEN 5; 325 MG/1; MG/1
TABLET ORAL
Qty: 30 TABLET | Refills: 0 | Status: SHIPPED | OUTPATIENT
Start: 2017-11-03

## 2017-11-03 RX ORDER — MORPHINE SULFATE 2 MG/ML
6 INJECTION, SOLUTION INTRAMUSCULAR; INTRAVENOUS EVERY 4 HOURS PRN
Status: DISCONTINUED | OUTPATIENT
Start: 2017-11-03 | End: 2017-11-03

## 2017-11-03 RX ORDER — HYDROMORPHONE HYDROCHLORIDE 2 MG/ML
0.5 INJECTION, SOLUTION INTRAMUSCULAR; INTRAVENOUS; SUBCUTANEOUS EVERY 6 HOURS PRN
Status: DISCONTINUED | OUTPATIENT
Start: 2017-11-03 | End: 2017-11-03 | Stop reason: HOSPADM

## 2017-11-03 RX ORDER — HYDROCODONE BITARTRATE AND ACETAMINOPHEN 500; 5 MG/1; MG/1
TABLET ORAL
Status: DISCONTINUED | OUTPATIENT
Start: 2017-11-03 | End: 2017-11-03 | Stop reason: HOSPADM

## 2017-11-03 RX ORDER — KETOROLAC TROMETHAMINE 10 MG/1
10 TABLET, FILM COATED ORAL
Qty: 30 TABLET | Refills: 0 | Status: SHIPPED | OUTPATIENT
Start: 2017-11-03

## 2017-11-03 RX ORDER — DOXEPIN HYDROCHLORIDE 25 MG/1
150 CAPSULE ORAL NIGHTLY PRN
Status: DISCONTINUED | OUTPATIENT
Start: 2017-11-03 | End: 2017-11-03 | Stop reason: HOSPADM

## 2017-11-03 RX ORDER — CARBAMAZEPINE 200 MG/1
200 TABLET ORAL DAILY
Status: DISCONTINUED | OUTPATIENT
Start: 2017-11-04 | End: 2017-11-03 | Stop reason: HOSPADM

## 2017-11-03 RX ORDER — AMOXICILLIN 250 MG
2 CAPSULE ORAL 2 TIMES DAILY
Status: DISCONTINUED | OUTPATIENT
Start: 2017-11-03 | End: 2017-11-03 | Stop reason: HOSPADM

## 2017-11-03 RX ORDER — CARBAMAZEPINE 200 MG/1
400 TABLET ORAL NIGHTLY
Status: DISCONTINUED | OUTPATIENT
Start: 2017-11-03 | End: 2017-11-03 | Stop reason: HOSPADM

## 2017-11-03 RX ORDER — FERROUS SULFATE 325(65) MG
325 TABLET, DELAYED RELEASE (ENTERIC COATED) ORAL 3 TIMES DAILY
Status: DISCONTINUED | OUTPATIENT
Start: 2017-11-03 | End: 2017-11-03

## 2017-11-03 RX ORDER — CLONAZEPAM 1 MG/1
1 TABLET ORAL 3 TIMES DAILY PRN
Qty: 30 TABLET | Refills: 0
Start: 2017-11-03

## 2017-11-03 RX ORDER — KETOROLAC TROMETHAMINE 30 MG/ML
30 INJECTION, SOLUTION INTRAMUSCULAR; INTRAVENOUS EVERY 6 HOURS PRN
Status: DISCONTINUED | OUTPATIENT
Start: 2017-11-03 | End: 2017-11-03 | Stop reason: HOSPADM

## 2017-11-03 RX ORDER — CARBAMAZEPINE 100 MG/1
400 TABLET, CHEWABLE ORAL NIGHTLY
Qty: 30 TABLET | Refills: 0
Start: 2017-11-03

## 2017-11-03 RX ADMIN — HYDROMORPHONE HYDROCHLORIDE 1 MG: 2 INJECTION INTRAMUSCULAR; INTRAVENOUS; SUBCUTANEOUS at 02:11

## 2017-11-03 RX ADMIN — GABAPENTIN 400 MG: 400 CAPSULE ORAL at 05:11

## 2017-11-03 RX ADMIN — OLANZAPINE 5 MG: 5 TABLET, FILM COATED ORAL at 08:11

## 2017-11-03 RX ADMIN — SODIUM FERRIC GLUCONATE COMPLEX 125 MG: 12.5 INJECTION INTRAVENOUS at 03:11

## 2017-11-03 RX ADMIN — HYDROMORPHONE HYDROCHLORIDE 0.5 MG: 2 INJECTION INTRAMUSCULAR; INTRAVENOUS; SUBCUTANEOUS at 03:11

## 2017-11-03 RX ADMIN — STANDARDIZED SENNA CONCENTRATE AND DOCUSATE SODIUM 1 TABLET: 8.6; 5 TABLET, FILM COATED ORAL at 08:11

## 2017-11-03 RX ADMIN — ONDANSETRON HYDROCHLORIDE 8 MG: 2 INJECTION, SOLUTION INTRAMUSCULAR; INTRAVENOUS at 11:11

## 2017-11-03 RX ADMIN — KETOROLAC TROMETHAMINE 15 MG: 30 INJECTION, SOLUTION INTRAMUSCULAR at 05:11

## 2017-11-03 RX ADMIN — MORPHINE SULFATE 6 MG: 2 INJECTION, SOLUTION INTRAMUSCULAR; INTRAVENOUS at 11:11

## 2017-11-03 RX ADMIN — SODIUM CHLORIDE: 0.9 INJECTION, SOLUTION INTRAVENOUS at 02:11

## 2017-11-03 RX ADMIN — GABAPENTIN 400 MG: 400 CAPSULE ORAL at 02:11

## 2017-11-03 RX ADMIN — OXYCODONE HYDROCHLORIDE AND ACETAMINOPHEN 1 TABLET: 10; 325 TABLET ORAL at 04:11

## 2017-11-03 RX ADMIN — OXYCODONE HYDROCHLORIDE AND ACETAMINOPHEN 1 TABLET: 10; 325 TABLET ORAL at 12:11

## 2017-11-03 RX ADMIN — HYDROMORPHONE HYDROCHLORIDE 1 MG: 2 INJECTION INTRAMUSCULAR; INTRAVENOUS; SUBCUTANEOUS at 07:11

## 2017-11-03 RX ADMIN — CARBAMAZEPINE 200 MG: 200 TABLET ORAL at 08:11

## 2017-11-03 RX ADMIN — CLONAZEPAM 1 MG: 1 TABLET ORAL at 02:11

## 2017-11-03 RX ADMIN — SIMETHICONE CHEW TAB 80 MG 80 MG: 80 TABLET ORAL at 03:11

## 2017-11-03 RX ADMIN — MORPHINE SULFATE 2 MG: 4 INJECTION INTRAVENOUS at 08:11

## 2017-11-03 RX ADMIN — DICYCLOMINE HYDROCHLORIDE 20 MG: 10 CAPSULE ORAL at 02:11

## 2017-11-03 RX ADMIN — KETOROLAC TROMETHAMINE 30 MG: 30 INJECTION, SOLUTION INTRAMUSCULAR at 01:11

## 2017-11-03 RX ADMIN — CLONAZEPAM 1 MG: 1 TABLET ORAL at 06:11

## 2017-11-03 RX ADMIN — MUPIROCIN 1 G: 20 OINTMENT TOPICAL at 09:11

## 2017-11-03 NOTE — TREATMENT PLAN
Changed linens to bed, drsg. To right CRISTIAN drain.  Brought pt. x6 drinks -sodas, juices, milk multiple times  During shift.  Consistently states her pain level is always a 10/10.

## 2017-11-03 NOTE — HOSPITAL COURSE
She was taken to the OR with general surgery for exploratory laparotomy. Findings in the OR were consistent with Knowles defect with small bowel twisting behind this nearly the entirety of the small bowel. This was reduced with relative ease and small bowel ran in all limbs. She tolerated the surgery well. 2 abdominal drains were left. The drains were removed after 24 hours. She was cleared for discharge by her surgeon.   For her CATA, she was offered a blood transfusion which she declined. She requested an iron infusion instead. This was agreed upon and ordered. Her pain was treated with IV and oral narcotics. She complained of severe pain routinely despite treatment. She continued to leave the room and walk around the hospital floors with no apparent distress. It was deemed she reached maximal benefit of hospitalization.

## 2017-11-03 NOTE — ASSESSMENT & PLAN NOTE
S/P ex lap with reduction of Rai defect of the small bowel. POD #1.  Follow up with surgery in clinic in 2 weeks.

## 2017-11-03 NOTE — DISCHARGE SUMMARY
[facility] Medicine  Discharge Summary      Patient Name: Amaris Kruger  MRN: [id number]  Admission Date: 11/1/2017  Hospital Length of Stay: 1 days  Discharge Date and Time:  11/03/2017 4:28 PM  Attending Physician: Namita Sagastume MD   Discharging Provider: Namita Sagastume MD  Primary Care Provider: Primary Doctor No      HPI:   Amaris Kruger is a 37 y.o. female with a hx of Bipolar 1 Disorder, back pain,  iron deficiency anemia following bariatric surgery and Schizophrenia.  She was admitted to the service of hospital medicine with abdominal pain and CT findings of volvulus.  She presented to the ED with complaints of epigastric pain.  She reports intermittent sharp, stabbing abdominal pain that began 4 days ago and acutely worsened 5 hours PTA.  No exacerbating or alleviating factors. She reports diarrhea x 4 day but denies melena and blood in stool. She noted decrease in appetite secondary to the sx and has not eaten today. She does reports some nausea and vomiting. Pt was seen at [facility] 2 days ago and received a blood transfusion and reported needing an iron infusion but left AMA. She denies abd pain at that time. She endorses chronic migraines and takes 800 Ibuprofen BID, Naproxen TID, and BC rangel 4-8 q.d. She denies a hx of IV drug use. Pt reports a gastric bypass procedure 12 years ago without any postop follow up. She reports a recent addition of Tegretol to her medications. Lives in a group home in [place].  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  Dr. Randolph (general surgery) was consulted from ED and is in route to take the patient for surgery.  She was agitated in ED and home psychiatric medications were administered.    Procedure(s) (LRB):  EXPLORATORY-LAPAROTOMY (N/A)  REPAIR-HERNIA (N/A)      Hospital Course:   She was taken to the OR with general  surgery for exploratory laparotomy. Findings in the OR were consistent with Knowles defect with small bowel twisting behind this nearly the entirety of the small bowel. This was reduced with relative ease and small bowel ran in all limbs. She tolerated the surgery well. 2 abdominal drains were left. The drains were removed after 24 hours. She was cleared for discharge by her surgeon.   For her CATA, she was offered a blood transfusion which she declined. She requested an iron infusion instead. This was agreed upon and ordered. Her pain was treated with IV and oral narcotics. She complained of severe pain routinely despite treatment. She continued to leave the room and walk around the hospital floors with no apparent distress. It was deemed she reached maximal benefit of hospitalization.      Consults: General Surgery    * Volvulus    S/P ex lap with reduction of Rai defect of the small bowel. POD #1.  Follow up with surgery in clinic in 2 weeks.             Cigarette nicotine dependence without complication    Cessation encouraged.             Bipolar 1 disorder    Continue routine antipsychotics. Stable.         Iron deficiency anemia following bariatric surgery    Uncontrolled. Follow up with hematology.             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    Specialty:  Family Medicine  Why:  As needed  Contact information:  [address]  [place]  [id number]  [phone]               Patient Instructions:     Diet general     Activity as tolerated     Lifting restrictions     Significant Diagnostic Studies: Radiology: CT scan: CT ABDOMEN PELVIS WITH CONTRAST:   Results for orders placed or performed during the hospital encounter of 11/01/17   CT Abdomen Pelvis With Contrast    Narrative    Axial images are obtained from the dome of the diaphragm to the floor of the pelvis. The patient received oral and IV contrast 75 cc Omnipaque 350..Comparison is made with the prior study of April 1, 2013.    The liver is of normal size and contour without focal mass. The liver parenchymal  density is within normal limits.. The gallbladder is of normal size without CT evidence of stone.      The pancreas appears of normal contour and CT density without mass, edema or pseudocyst.  The spleen is of normal size and CT density without focal mass. The patient has had a gastric bypass with multiple surgical clips noted. There is passage of the oral contrast from the gastric pouch into multiple small bowel loops. The kidneys are of normal size and CT density with normal excretory function. No mass or hydronephrosis is seen.  No stones are noted. The abdominal aorta and inferior vena cava are of normal caliber.    There is seen to be a twisting of the bowel loops centered around the superior mesenteric artery and vein in the central abdomen with constriction of the superior mesenteric vein to occlusion and congestion more distally. There is edema of the mesenteric fat. This is a probable transmesenteric post-operative hernia. There is liquid stool throughout the colon with air-fluid levels and  pneumatosis coli identified in the transverse and splenic flexure. Small bowel loops are not markedly distended. A normal appendix is noted.     In the pelvis, the bladder is without mass or asymmetry. The uterus is tilted to the right with a 3.8 cm left ovarian cyst and small amount of fluid  in the cul-de-sac.    Impression     Transmesenteric post-op internal hernia  around the superior mesenteric artery and vein with occlusion of the superior mesenteric vein. Pneumatosis coli noted in the transverse colon and splenic flexure of the colon. Liquid stool throughout the colon. Small bowel obstruction is not identified however. Prior gastric bypass procedure without obstruction. Prior cholecystectomy. 3.8 cm cyst of the left ovary.      Electronically signed by: Galdino Glasgow MD  Date:     11/02/17  Time:    10:49        Pending Diagnostic Studies:     None         Medications:  Reconciled Home Medications:   Current Discharge Medication List      START taking these medications    Details   dicyclomine (BENTYL) 10 MG capsule Take 2 capsules (20 mg total) by mouth every 6 (six) hours as needed (abd pain).  Qty: 30 capsule, Refills: 0      hydrocodone-acetaminophen 5-325mg (NORCO) 5-325 mg per tablet 1-2 tabs every 4-6 hrs prn pain  Qty: 30 tablet, Refills: 0      ketorolac (TORADOL) 10 mg tablet Take 1 tablet (10 mg total) by mouth every 4 to 6 hours as needed for Pain.  Qty: 30 tablet, Refills: 0      mupirocin (BACTROBAN) 2 % ointment 1 g by Nasal route 2 (two) times daily.  Qty: 1 Tube, Refills: 0      senna-docusate 8.6-50 mg (PERICOLACE) 8.6-50 mg per tablet Take 2 tablets by mouth 2 (two) times daily.         CONTINUE these medications which have CHANGED    Details   carBAMazepine (TEGRETOL) 100 mg chewable tablet Take 4 tablets (400 mg total) by mouth every evening.  Qty: 30 tablet, Refills: 0      clonazePAM (KLONOPIN) 1 MG tablet Take 1 tablet (1 mg total) by mouth 3 (three) times daily as needed for Anxiety.  Qty: 30 tablet, Refills: 0         CONTINUE these medications which have NOT CHANGED    Details   albuterol 90 mcg/actuation inhaler Inhale 2 puffs into the lungs every 6 (six) hours as needed for Wheezing. Rescue  Qty: 18 g, Refills: 0    Associated Diagnoses: Cough; Shortness of breath on  exertion; Chronic bronchitis with acute exacerbation      atomoxetine (STRATTERA) 60 MG capsule Take 60 mg by mouth every morning.       carbamazepine (TEGRETOL) 200 mg tablet Take 200 mg by mouth every morning.       doxepin (SINEQUAN) 150 MG Cap Take 150 mg by mouth nightly as needed.      gabapentin (NEURONTIN) 400 MG capsule Take 400 mg by mouth 3 (three) times daily.       !! olanzapine (ZYPREXA) 20 MG tablet Take 20 mg by mouth every evening.      !! olanzapine (ZYPREXA) 5 MG tablet Take 5 mg by mouth every morning.        !! - Potential duplicate medications found. Please discuss with provider.      STOP taking these medications       ibuprofen (ADVIL,MOTRIN) 800 MG tablet Comments:   Reason for Stopping:             Indwelling Lines/Drains at time of discharge:   Lines/Drains/Airways     Drain                 Closed/Suction Drain 11/02/17 0126 Abdomen Bulb 19 Fr. 1 day         Closed/Suction Drain 11/02/17 0126 Abdomen Bulb 19 Fr. 1 day            Time spent on the discharge of patient: 33 minutes    Patient was seen and examined on the date of discharge and determined to be suitable for discharge.       Namita Sagastume MD  Department of Hospital Medicine  [facility]

## 2017-11-03 NOTE — PLAN OF CARE
Cm tried calling the following contact for pt, due to possible discharge.  Cm called Rubén Bunch at  [phone]; Cathi's sister @ [phone],  Grandma Jasmine at [phone].  Finally called Jasmine @ [phone] and she answered, she will have Cathi contact me, because she is unable to pick her up.  Cm will notify patient

## 2017-11-03 NOTE — DISCHARGE INSTRUCTIONS
Thank you for choosing [facility] for your medical care. The primary doctor who is taking care of you at the time of your discharge is Namita Sagastume MD.     You were admitted to the hospital with Volvulus.     Please note your discharge instructions, including diet/activity restrictions, follow-up appointments, and medication changes.  If you have any questions about your medical issues, prescriptions, or any other questions, please feel free to contact the [facility] Medicine Dept at [phone] and we will help.    If you are previously with Home health, outpatient PT/OT or under a therapy program, you are cleared to return to those programs.    Please direct all long term medication refills and follow up to your primary care provider, Primary Doctor No. Thank you again for letting us take care of your health care needs.    Please note the following discharge instructions per your discharging physician-  Take medications as prescribed.

## 2017-11-03 NOTE — PROGRESS NOTES
11/03/17 0729   Patient Assessment/Suction   Level of Consciousness (AVPU) alert   All Lung Fields Breath Sounds clear   PRE-TX-O2-ETCO2   O2 Device (Oxygen Therapy) room air   SpO2 99 %   Pulse Oximetry Type Intermittent   $ Pulse Oximetry - Multiple Charge Pulse Oximetry - Multiple   Pulse 101   Resp 18

## 2017-11-03 NOTE — PROGRESS NOTES
11/02/17 2031   Patient Assessment/Suction   Level of Consciousness (AVPU) alert   Respiratory Effort Normal;Unlabored   PRE-TX-O2-ETCO2   O2 Device (Oxygen Therapy) room air   SpO2 99 %   Pulse Oximetry Type Intermittent   Pulse 94   Resp 18

## 2017-11-03 NOTE — PLAN OF CARE
"Problem: Patient Care Overview  Goal: Individualization & Mutuality  Outcome: Ongoing (interventions implemented as appropriate)  Pt. With "flight of ideas" throughout shift.  Pt. Very labile, contradictory with requests, statements concerning care.  Walks in room at a fast pace, remaking her bed.  Asks for pain medicine hourly.   Incisional area/bandages intact -without drainage.  Applied hospital socks -explained her own socks  May cause her to slip on the floor.  Instructed pt. To call when she wants to get   OOB for safety precautions.        "

## 2017-11-03 NOTE — PROGRESS NOTES
"1124-  Arrived to room with 6mg IV morphine to administer to patient, as she requested pain medicine over call light d/t pain rated 10/10. Patient adamant that in d/w Dr Sagastume, the agreed upon change(s) to pain med regimen included one more additional dose of IV Dilaudid. Notified patient that I would clarify with her primary nurse Kenny, ARVIDN, and Dr Sagastume. Patient states, "Why isn't my nurse in here? That's probably why she sent you--Because she didn't want to give it to me." Patient notified that the scope of practice differs between LPN to RN re IV push medications.     This nurse stepped out, having not administered morphine d/t the above. D/w primary nurse and Dr Sagastume. Dr Sagastume confirms that only 6 mg Morphine IV now ordered for pain and Dilaudid was discontinued. We requested that Dr Sagastume please clarify her pain management regimen with patient. Dr Sagastume states she will do so shortly, after finishing her current round with another patient.    This nurse and Kenny SAMUELS returned to room to clarify w/ patient the medication changes. Patient adamantly states that what was agreed upon with Dr Sagastume included IV Dilaudid. Patient then becomes tearful and states she wants her Morphine and Percocet now. Patient educated in safety risk and inability to administer both drugs simultaneously. Kenny SAMUELS notifies patient that she may receive PO Percocet at next pain assessment, 30 min following administration of IV Morphine. Patient expressing frustration, stating Morphine ineffective for pain & that only Dilaudid worked for her pain. Patient notified that this dose of Morphine is 6 mg, compared to the 2 mg she previously was receiving.     Patient notified that her doctor will come speak with her soon to clarify changes in medications. Patient stating, "She doesn't need to come speak to me. I know what she told me, I know what she said, and you guys just don't want to give me my medicine! I know you all " "are lying to me. I am going home today, I don't care what she says. She will discharge me today. I'm not going to take the blood transfusion. I want my pain medicine and I'm leaving."    1136  Patient notified again that Morphine was not given at first attempt due to her initial refusal. Patient states she wants her doctor to see her.    IV Morphine administered. Patient requesting IV Zofran    This nurse leaves to pull IV Zofran for administration. ARVIND Cox, no longer present in room. Zofran given. Patient requesting that this nurse stay in her room until it is time to give her PO Percocet. I then notified patient that I cannot stay in her room for 30 minutes but that her nurse Kenny SAMUELS will be notified that the patient requests it in 30 minutes. Patient then states, "OK, well I'll just come follow you around then until you give it to me. And you didn't tell me 30 minutes--you said 10 minutes!"   I remind her that her doctor will not see her if she is not present in her room. She then states, "I don't care. I won't see her because I've got to follow you around!"    11:48 AM  Dr Sagastume arrived to nurses' station, stating she attempted to see patient and she was not in room. Dr Sagastume states OK to call her when patient returns to room.    1155  Dr Sagastume In patient's room to discuss her POC.  "

## 2017-11-03 NOTE — PROGRESS NOTES
1:50 PM  Sent message to Dr Sagastume via [employer] re patient's request for gas medicine/Simethicone.

## 2017-11-03 NOTE — PROGRESS NOTES
POD 2 resting in bed eating dinner.  Pt notes feeling well.  No n/v.  Tolerating diet  Pt pulled one drain overnight other pulled by nuring today.     Wt Readings from Last 3 Encounters:   11/02/17 75.3 kg (166 lb 0.1 oz)   10/30/17 76.4 kg (168 lb 6.4 oz)   10/02/17 72 kg (158 lb 11.7 oz)     Temp Readings from Last 3 Encounters:   11/03/17 99.1 °F (37.3 °C) (Oral)   10/30/17 98.3 °F (36.8 °C)   10/02/17 97.7 °F (36.5 °C) (Oral)     BP Readings from Last 3 Encounters:   11/03/17 109/60   10/30/17 112/73   10/02/17 110/68     Pulse Readings from Last 3 Encounters:   11/03/17 87   10/30/17 96   10/02/17 92     AAox3  CTA B  Sioft/nd/ appt ttp  +BS  2+ pulses     Lab Results   Component Value Date    WBC 4.40 11/03/2017    HGB 7.2 (L) 11/03/2017    HCT 23.1 (L) 11/03/2017    MCV 68 (L) 11/03/2017     11/03/2017     BMP  Lab Results   Component Value Date     11/02/2017    K 3.6 11/02/2017     (H) 11/02/2017    CO2 20 (L) 11/02/2017    BUN 5 (L) 11/02/2017    CREATININE 0.6 11/02/2017    CALCIUM 8.0 (L) 11/02/2017    ANIONGAP 7 (L) 11/02/2017    ESTGFRAFRICA >60 11/02/2017    EGFRNONAA >60 11/02/2017     A/P: s/p ex lap and repair of internal hernia  Doing well. Okay for d/c from surgical perspective.

## 2017-11-03 NOTE — PLAN OF CARE
Joe tried calling Jasmine at [phone] and she told me she does not know where Cathi is .  I informed her that the pt will be discharged today after her infusion.  In approximately 2 hours. Pt will need a ride home.  Jasmine told me she will give Cathi the message.  She said, Cathi might be in the hospital. I informed her that pt is discharged and will need someone to pick her up. I gave her the  number to call after 4:30p.- [phone].  Jasmine said, she will.  Joe will notify ARVIND Cox and patient.

## 2017-11-04 NOTE — PLAN OF CARE
11/04/17 0905   Final Note   Assessment Type Final Discharge Note   Discharge Disposition Home

## 2017-11-04 NOTE — NURSING
PIV removed. Pt refused to listen to discharge instructions but she is aware to  rx's at her pharmacy. Pt states she will not be going to her f/u appt bc she wants a new doctor. Pt refused last dose of lovenox and also refused to have her dressing changed prior to d/c. Pt transported off unit safely by [employer].

## 2017-11-05 LAB
BLD PROD TYP BPU: NORMAL
BLD PROD TYP BPU: NORMAL
BLOOD UNIT EXPIRATION DATE: NORMAL
BLOOD UNIT EXPIRATION DATE: NORMAL
BLOOD UNIT TYPE CODE: 7300
BLOOD UNIT TYPE CODE: 7300
BLOOD UNIT TYPE: NORMAL
BLOOD UNIT TYPE: NORMAL
CODING SYSTEM: NORMAL
CODING SYSTEM: NORMAL
DISPENSE STATUS: NORMAL
DISPENSE STATUS: NORMAL
NUM UNITS TRANS PACKED RBC: NORMAL
NUM UNITS TRANS PACKED RBC: NORMAL

## 2017-11-06 ENCOUNTER — TELEPHONE (OUTPATIENT)
Dept: MEDSURG UNIT | Facility: HOSPITAL | Age: 37
End: 2017-11-06

## 2017-11-06 ENCOUNTER — HOSPITAL ENCOUNTER (OUTPATIENT)
Facility: HOSPITAL | Age: 37
Discharge: HOME | End: 2017-11-07
Attending: EMERGENCY MEDICINE | Admitting: FAMILY MEDICINE
Payer: MEDICARE

## 2017-11-06 DIAGNOSIS — I82.441 ACUTE DEEP VEIN THROMBOSIS (DVT) OF TIBIAL VEIN OF RIGHT LOWER EXTREMITY: ICD-10-CM

## 2017-11-06 DIAGNOSIS — R10.9 ABDOMINAL PAIN: Primary | ICD-10-CM

## 2017-11-06 DIAGNOSIS — R05.9 COUGH: ICD-10-CM

## 2017-11-06 LAB
ALBUMIN SERPL BCP-MCNC: 2.8 G/DL
ALP SERPL-CCNC: 187 U/L
ALT SERPL W/O P-5'-P-CCNC: 257 U/L
ANION GAP SERPL CALC-SCNC: 12 MMOL/L
ANISOCYTOSIS BLD QL SMEAR: ABNORMAL
AST SERPL-CCNC: 70 U/L
BACTERIA #/AREA URNS HPF: ABNORMAL /HPF
BACTERIA SPEC AEROBE CULT: NO GROWTH
BASOPHILS NFR BLD: 0 %
BILIRUB SERPL-MCNC: 0.2 MG/DL
BILIRUB UR QL STRIP: NEGATIVE
BUN SERPL-MCNC: 6 MG/DL
CALCIUM SERPL-MCNC: 8.4 MG/DL
CHLORIDE SERPL-SCNC: 110 MMOL/L
CLARITY UR: CLEAR
CO2 SERPL-SCNC: 17 MMOL/L
COLOR UR: YELLOW
CREAT SERPL-MCNC: 0.6 MG/DL
DIFFERENTIAL METHOD: ABNORMAL
EOSINOPHIL NFR BLD: 0 %
ERYTHROCYTE [DISTWIDTH] IN BLOOD BY AUTOMATED COUNT: 24.1 %
EST. GFR  (AFRICAN AMERICAN): >60 ML/MIN/1.73 M^2
EST. GFR  (NON AFRICAN AMERICAN): >60 ML/MIN/1.73 M^2
GLUCOSE SERPL-MCNC: 88 MG/DL
GLUCOSE UR QL STRIP: NEGATIVE
HCT VFR BLD AUTO: 26.2 %
HGB BLD-MCNC: 8.1 G/DL
HGB UR QL STRIP: ABNORMAL
HYPOCHROMIA BLD QL SMEAR: ABNORMAL
KETONES UR QL STRIP: NEGATIVE
LEUKOCYTE ESTERASE UR QL STRIP: NEGATIVE
LYMPHOCYTES NFR BLD: 17 %
MCH RBC QN AUTO: 21.2 PG
MCHC RBC AUTO-ENTMCNC: 30.8 G/DL
MCV RBC AUTO: 69 FL
MICROSCOPIC COMMENT: ABNORMAL
MONOCYTES NFR BLD: 6 %
NEUTROPHILS NFR BLD: 75 %
NEUTS BAND NFR BLD MANUAL: 2 %
NITRITE UR QL STRIP: NEGATIVE
PH UR STRIP: 6 [PH] (ref 5–8)
PLATELET # BLD AUTO: 326 K/UL
PLATELET BLD QL SMEAR: ABNORMAL
PMV BLD AUTO: 7.8 FL
POTASSIUM SERPL-SCNC: 3.4 MMOL/L
PROT SERPL-MCNC: 6.2 G/DL
PROT UR QL STRIP: NEGATIVE
RBC # BLD AUTO: 3.81 M/UL
RBC #/AREA URNS HPF: 5 /HPF (ref 0–4)
SODIUM SERPL-SCNC: 139 MMOL/L
SP GR UR STRIP: <=1.005 (ref 1–1.03)
SQUAMOUS #/AREA URNS HPF: 3 /HPF
URN SPEC COLLECT METH UR: ABNORMAL
UROBILINOGEN UR STRIP-ACNC: NEGATIVE EU/DL
WBC # BLD AUTO: 9.6 K/UL

## 2017-11-06 PROCEDURE — 80053 COMPREHEN METABOLIC PANEL: CPT

## 2017-11-06 PROCEDURE — 36415 COLL VENOUS BLD VENIPUNCTURE: CPT

## 2017-11-06 PROCEDURE — 85007 BL SMEAR W/DIFF WBC COUNT: CPT | Mod: NCS

## 2017-11-06 PROCEDURE — 99284 EMERGENCY DEPT VISIT MOD MDM: CPT

## 2017-11-06 PROCEDURE — 85027 COMPLETE CBC AUTOMATED: CPT

## 2017-11-06 PROCEDURE — G0378 HOSPITAL OBSERVATION PER HR: HCPCS

## 2017-11-06 PROCEDURE — 81000 URINALYSIS NONAUTO W/SCOPE: CPT

## 2017-11-06 RX ORDER — POTASSIUM CHLORIDE 20 MEQ/15ML
40 SOLUTION ORAL
Status: DISCONTINUED | OUTPATIENT
Start: 2017-11-06 | End: 2017-11-07 | Stop reason: HOSPADM

## 2017-11-06 RX ORDER — OLANZAPINE 5 MG/1
5 TABLET ORAL EVERY MORNING
Status: DISCONTINUED | OUTPATIENT
Start: 2017-11-07 | End: 2017-11-07 | Stop reason: HOSPADM

## 2017-11-06 RX ORDER — ALBUTEROL SULFATE 2.5 MG/.5ML
2.5 SOLUTION RESPIRATORY (INHALATION) EVERY 6 HOURS PRN
Status: DISCONTINUED | OUTPATIENT
Start: 2017-11-07 | End: 2017-11-07 | Stop reason: HOSPADM

## 2017-11-06 RX ORDER — CARBAMAZEPINE 200 MG/1
400 TABLET ORAL NIGHTLY
Status: DISCONTINUED | OUTPATIENT
Start: 2017-11-07 | End: 2017-11-07 | Stop reason: HOSPADM

## 2017-11-06 RX ORDER — ALBUTEROL SULFATE 90 UG/1
2 AEROSOL, METERED RESPIRATORY (INHALATION) EVERY 6 HOURS PRN
Status: DISCONTINUED | OUTPATIENT
Start: 2017-11-06 | End: 2017-11-06

## 2017-11-06 RX ORDER — KETOROLAC TROMETHAMINE 30 MG/ML
15 INJECTION, SOLUTION INTRAMUSCULAR; INTRAVENOUS EVERY 6 HOURS PRN
Status: DISCONTINUED | OUTPATIENT
Start: 2017-11-06 | End: 2017-11-07 | Stop reason: HOSPADM

## 2017-11-06 RX ORDER — DICYCLOMINE HYDROCHLORIDE 10 MG/1
20 CAPSULE ORAL EVERY 6 HOURS PRN
Status: DISCONTINUED | OUTPATIENT
Start: 2017-11-06 | End: 2017-11-07 | Stop reason: HOSPADM

## 2017-11-06 RX ORDER — PROMETHAZINE HYDROCHLORIDE 25 MG/1
25 TABLET ORAL EVERY 6 HOURS PRN
Status: DISCONTINUED | OUTPATIENT
Start: 2017-11-06 | End: 2017-11-07 | Stop reason: HOSPADM

## 2017-11-06 RX ORDER — HYDROCODONE BITARTRATE AND ACETAMINOPHEN 5; 325 MG/1; MG/1
1 TABLET ORAL EVERY 4 HOURS PRN
Status: DISCONTINUED | OUTPATIENT
Start: 2017-11-06 | End: 2017-11-07

## 2017-11-06 RX ORDER — CLONAZEPAM 1 MG/1
1 TABLET ORAL 3 TIMES DAILY PRN
Status: DISCONTINUED | OUTPATIENT
Start: 2017-11-06 | End: 2017-11-07 | Stop reason: HOSPADM

## 2017-11-06 RX ORDER — SODIUM CHLORIDE 9 MG/ML
INJECTION, SOLUTION INTRAVENOUS CONTINUOUS
Status: DISCONTINUED | OUTPATIENT
Start: 2017-11-07 | End: 2017-11-07

## 2017-11-06 RX ORDER — OLANZAPINE 5 MG/1
20 TABLET ORAL NIGHTLY
Status: DISCONTINUED | OUTPATIENT
Start: 2017-11-07 | End: 2017-11-07 | Stop reason: HOSPADM

## 2017-11-06 RX ORDER — AMOXICILLIN 250 MG
2 CAPSULE ORAL 2 TIMES DAILY
Status: DISCONTINUED | OUTPATIENT
Start: 2017-11-07 | End: 2017-11-07 | Stop reason: HOSPADM

## 2017-11-06 RX ORDER — POTASSIUM CHLORIDE 20 MEQ/15ML
60 SOLUTION ORAL
Status: DISCONTINUED | OUTPATIENT
Start: 2017-11-06 | End: 2017-11-07 | Stop reason: HOSPADM

## 2017-11-06 RX ORDER — LANOLIN ALCOHOL/MO/W.PET/CERES
800 CREAM (GRAM) TOPICAL
Status: DISCONTINUED | OUTPATIENT
Start: 2017-11-06 | End: 2017-11-07 | Stop reason: HOSPADM

## 2017-11-06 RX ORDER — CARBAMAZEPINE 200 MG/1
200 TABLET ORAL EVERY MORNING
Status: DISCONTINUED | OUTPATIENT
Start: 2017-11-07 | End: 2017-11-07 | Stop reason: HOSPADM

## 2017-11-06 RX ORDER — ACETAMINOPHEN 500 MG
1000 TABLET ORAL EVERY 6 HOURS PRN
Status: DISCONTINUED | OUTPATIENT
Start: 2017-11-06 | End: 2017-11-07 | Stop reason: HOSPADM

## 2017-11-06 RX ORDER — DOXEPIN HYDROCHLORIDE 25 MG/1
150 CAPSULE ORAL NIGHTLY PRN
Status: DISCONTINUED | OUTPATIENT
Start: 2017-11-06 | End: 2017-11-07 | Stop reason: HOSPADM

## 2017-11-06 RX ORDER — ONDANSETRON 2 MG/ML
8 INJECTION INTRAMUSCULAR; INTRAVENOUS EVERY 8 HOURS PRN
Status: DISCONTINUED | OUTPATIENT
Start: 2017-11-06 | End: 2017-11-07 | Stop reason: HOSPADM

## 2017-11-06 RX ORDER — ENOXAPARIN SODIUM 100 MG/ML
40 INJECTION SUBCUTANEOUS EVERY 24 HOURS
Status: DISCONTINUED | OUTPATIENT
Start: 2017-11-07 | End: 2017-11-07

## 2017-11-06 RX ORDER — GABAPENTIN 400 MG/1
400 CAPSULE ORAL 3 TIMES DAILY
Status: DISCONTINUED | OUTPATIENT
Start: 2017-11-07 | End: 2017-11-07 | Stop reason: HOSPADM

## 2017-11-07 VITALS
HEIGHT: 65 IN | OXYGEN SATURATION: 98 % | HEART RATE: 83 BPM | TEMPERATURE: 98 F | BODY MASS INDEX: 28.54 KG/M2 | DIASTOLIC BLOOD PRESSURE: 67 MMHG | RESPIRATION RATE: 17 BRPM | WEIGHT: 171.31 LBS | SYSTOLIC BLOOD PRESSURE: 114 MMHG

## 2017-11-07 PROBLEM — R05.9 COUGH: Status: ACTIVE | Noted: 2017-11-07

## 2017-11-07 PROBLEM — M79.89 LEG SWELLING: Status: ACTIVE | Noted: 2017-11-07

## 2017-11-07 PROBLEM — R74.01 TRANSAMINITIS: Status: ACTIVE | Noted: 2017-11-07

## 2017-11-07 LAB
ALBUMIN SERPL BCP-MCNC: 2.3 G/DL
ALP SERPL-CCNC: 157 U/L
ALT SERPL W/O P-5'-P-CCNC: 196 U/L
ANION GAP SERPL CALC-SCNC: 9 MMOL/L
AST SERPL-CCNC: 55 U/L
BASOPHILS # BLD AUTO: 0 K/UL
BASOPHILS NFR BLD: 0 %
BILIRUB SERPL-MCNC: 0.2 MG/DL
BUN SERPL-MCNC: 4 MG/DL
CALCIUM SERPL-MCNC: 8.1 MG/DL
CHLORIDE SERPL-SCNC: 114 MMOL/L
CO2 SERPL-SCNC: 22 MMOL/L
CREAT SERPL-MCNC: 0.5 MG/DL
DIFFERENTIAL METHOD: ABNORMAL
EOSINOPHIL # BLD AUTO: 0 K/UL
EOSINOPHIL NFR BLD: 0 %
ERYTHROCYTE [DISTWIDTH] IN BLOOD BY AUTOMATED COUNT: 23.4 %
EST. GFR  (AFRICAN AMERICAN): >60 ML/MIN/1.73 M^2
EST. GFR  (NON AFRICAN AMERICAN): >60 ML/MIN/1.73 M^2
GLUCOSE SERPL-MCNC: 73 MG/DL
HCT VFR BLD AUTO: 24.5 %
HGB BLD-MCNC: 7.7 G/DL
IRON SERPL-MCNC: 13 UG/DL
LYMPHOCYTES # BLD AUTO: 1.3 K/UL
LYMPHOCYTES NFR BLD: 22.6 %
MAGNESIUM SERPL-MCNC: 1.7 MG/DL
MCH RBC QN AUTO: 21.5 PG
MCHC RBC AUTO-ENTMCNC: 31.4 G/DL
MCV RBC AUTO: 69 FL
MONOCYTES # BLD AUTO: 0.4 K/UL
MONOCYTES NFR BLD: 6.8 %
NEUTROPHILS # BLD AUTO: 4 K/UL
NEUTROPHILS NFR BLD: 70.6 %
PHOSPHATE SERPL-MCNC: 3.7 MG/DL
PLATELET # BLD AUTO: 317 K/UL
PMV BLD AUTO: 7.7 FL
POTASSIUM SERPL-SCNC: 3.3 MMOL/L
PROT SERPL-MCNC: 5.2 G/DL
RBC # BLD AUTO: 3.57 M/UL
SATURATED IRON: 3 %
SODIUM SERPL-SCNC: 145 MMOL/L
TOTAL IRON BINDING CAPACITY: 404 UG/DL
TRANSFERRIN SERPL-MCNC: 273 MG/DL
WBC # BLD AUTO: 5.6 K/UL

## 2017-11-07 PROCEDURE — 80053 COMPREHEN METABOLIC PANEL: CPT

## 2017-11-07 PROCEDURE — 25000003 PHARM REV CODE 250: Performed by: NURSE PRACTITIONER

## 2017-11-07 PROCEDURE — 96372 THER/PROPH/DIAG INJ SC/IM: CPT

## 2017-11-07 PROCEDURE — 96361 HYDRATE IV INFUSION ADD-ON: CPT

## 2017-11-07 PROCEDURE — 83735 ASSAY OF MAGNESIUM: CPT

## 2017-11-07 PROCEDURE — 96375 TX/PRO/DX INJ NEW DRUG ADDON: CPT

## 2017-11-07 PROCEDURE — 96368 THER/DIAG CONCURRENT INF: CPT

## 2017-11-07 PROCEDURE — 96376 TX/PRO/DX INJ SAME DRUG ADON: CPT

## 2017-11-07 PROCEDURE — 63600175 PHARM REV CODE 636 W HCPCS: Performed by: NURSE PRACTITIONER

## 2017-11-07 PROCEDURE — 36415 COLL VENOUS BLD VENIPUNCTURE: CPT

## 2017-11-07 PROCEDURE — 85025 COMPLETE CBC W/AUTO DIFF WBC: CPT

## 2017-11-07 PROCEDURE — 25000003 PHARM REV CODE 250: Performed by: HOSPITALIST

## 2017-11-07 PROCEDURE — 83540 ASSAY OF IRON: CPT

## 2017-11-07 PROCEDURE — 94799 UNLISTED PULMONARY SVC/PX: CPT

## 2017-11-07 PROCEDURE — 96374 THER/PROPH/DIAG INJ IV PUSH: CPT

## 2017-11-07 PROCEDURE — G0378 HOSPITAL OBSERVATION PER HR: HCPCS

## 2017-11-07 PROCEDURE — 99900035 HC TECH TIME PER 15 MIN (STAT)

## 2017-11-07 PROCEDURE — 84100 ASSAY OF PHOSPHORUS: CPT

## 2017-11-07 RX ORDER — HYDROMORPHONE HYDROCHLORIDE 1 MG/ML
0.5 INJECTION, SOLUTION INTRAMUSCULAR; INTRAVENOUS; SUBCUTANEOUS ONCE
Status: COMPLETED | OUTPATIENT
Start: 2017-11-07 | End: 2017-11-07

## 2017-11-07 RX ORDER — PROMETHAZINE HYDROCHLORIDE 25 MG/1
25 TABLET ORAL ONCE
Status: COMPLETED | OUTPATIENT
Start: 2017-11-07 | End: 2017-11-07

## 2017-11-07 RX ORDER — OXYCODONE AND ACETAMINOPHEN 5; 325 MG/1; MG/1
2 TABLET ORAL ONCE
Status: COMPLETED | OUTPATIENT
Start: 2017-11-07 | End: 2017-11-07

## 2017-11-07 RX ORDER — PROMETHAZINE HYDROCHLORIDE 25 MG/1
25 TABLET ORAL EVERY 4 HOURS PRN
Qty: 30 TABLET | Refills: 0 | Status: SHIPPED | OUTPATIENT
Start: 2017-11-07 | End: 2017-11-14

## 2017-11-07 RX ORDER — FERROUS GLUCONATE 324(38)MG
324 TABLET ORAL 2 TIMES DAILY WITH MEALS
Status: DISCONTINUED | OUTPATIENT
Start: 2017-11-07 | End: 2017-11-07 | Stop reason: HOSPADM

## 2017-11-07 RX ORDER — OXYCODONE AND ACETAMINOPHEN 5; 325 MG/1; MG/1
2 TABLET ORAL EVERY 6 HOURS PRN
Qty: 30 TABLET | Refills: 0 | Status: SHIPPED | OUTPATIENT
Start: 2017-11-07 | End: 2017-11-08 | Stop reason: HOSPADM

## 2017-11-07 RX ORDER — FERROUS GLUCONATE 324(38)MG
324 TABLET ORAL 2 TIMES DAILY WITH MEALS
COMMUNITY
Start: 2017-11-07

## 2017-11-07 RX ORDER — OXYCODONE AND ACETAMINOPHEN 5; 325 MG/1; MG/1
2 TABLET ORAL EVERY 6 HOURS PRN
Qty: 30 TABLET | Refills: 0 | Status: SHIPPED | OUTPATIENT
Start: 2017-11-07 | End: 2017-11-07

## 2017-11-07 RX ORDER — ACETAMINOPHEN 10 MG/ML
1000 INJECTION, SOLUTION INTRAVENOUS ONCE
Status: COMPLETED | OUTPATIENT
Start: 2017-11-07 | End: 2017-11-07

## 2017-11-07 RX ORDER — MAG HYDROX/ALUMINUM HYD/SIMETH 200-200-20
30 SUSPENSION, ORAL (FINAL DOSE FORM) ORAL ONCE
Status: COMPLETED | OUTPATIENT
Start: 2017-11-07 | End: 2017-11-07

## 2017-11-07 RX ORDER — SIMETHICONE 80 MG
1 TABLET,CHEWABLE ORAL ONCE
Status: COMPLETED | OUTPATIENT
Start: 2017-11-07 | End: 2017-11-07

## 2017-11-07 RX ORDER — ENOXAPARIN SODIUM 150 MG/ML
1 INJECTION SUBCUTANEOUS EVERY 12 HOURS
Qty: 30 ML | Refills: 0 | Status: SHIPPED | OUTPATIENT
Start: 2017-11-07 | End: 2017-12-07

## 2017-11-07 RX ORDER — DIPHENHYDRAMINE HCL 25 MG
25 CAPSULE ORAL ONCE
Status: COMPLETED | OUTPATIENT
Start: 2017-11-07 | End: 2017-11-07

## 2017-11-07 RX ORDER — BENZONATATE 100 MG/1
100 CAPSULE ORAL 3 TIMES DAILY PRN
Status: DISCONTINUED | OUTPATIENT
Start: 2017-11-07 | End: 2017-11-07 | Stop reason: HOSPADM

## 2017-11-07 RX ORDER — ENOXAPARIN SODIUM 150 MG/ML
1 INJECTION SUBCUTANEOUS
Status: DISCONTINUED | OUTPATIENT
Start: 2017-11-07 | End: 2017-11-07 | Stop reason: HOSPADM

## 2017-11-07 RX ADMIN — CLONAZEPAM 1 MG: 1 TABLET ORAL at 09:11

## 2017-11-07 RX ADMIN — GABAPENTIN 400 MG: 400 CAPSULE ORAL at 06:11

## 2017-11-07 RX ADMIN — FERROUS GLUCONATE 324 MG: 324 TABLET ORAL at 04:11

## 2017-11-07 RX ADMIN — STANDARDIZED SENNA CONCENTRATE AND DOCUSATE SODIUM 2 TABLET: 8.6; 5 TABLET, FILM COATED ORAL at 08:11

## 2017-11-07 RX ADMIN — HYDROMORPHONE HYDROCHLORIDE 0.5 MG: 1 INJECTION, SOLUTION INTRAMUSCULAR; INTRAVENOUS; SUBCUTANEOUS at 04:11

## 2017-11-07 RX ADMIN — ENOXAPARIN SODIUM 80 MG: 150 INJECTION SUBCUTANEOUS at 10:11

## 2017-11-07 RX ADMIN — KETOROLAC TROMETHAMINE 15 MG: 30 INJECTION, SOLUTION INTRAMUSCULAR at 08:11

## 2017-11-07 RX ADMIN — POTASSIUM CHLORIDE 40 MEQ: 20 SOLUTION ORAL at 09:11

## 2017-11-07 RX ADMIN — GABAPENTIN 400 MG: 400 CAPSULE ORAL at 03:11

## 2017-11-07 RX ADMIN — SIMETHICONE CHEW TAB 80 MG 80 MG: 80 TABLET ORAL at 04:11

## 2017-11-07 RX ADMIN — OXYCODONE AND ACETAMINOPHEN 2 TABLET: 5; 325 TABLET ORAL at 11:11

## 2017-11-07 RX ADMIN — CLONAZEPAM 1 MG: 1 TABLET ORAL at 12:11

## 2017-11-07 RX ADMIN — STANDARDIZED SENNA CONCENTRATE AND DOCUSATE SODIUM 2 TABLET: 8.6; 5 TABLET, FILM COATED ORAL at 12:11

## 2017-11-07 RX ADMIN — OLANZAPINE 5 MG: 5 TABLET ORAL at 06:11

## 2017-11-07 RX ADMIN — SODIUM CHLORIDE: 0.9 INJECTION, SOLUTION INTRAVENOUS at 12:11

## 2017-11-07 RX ADMIN — SODIUM CHLORIDE: 0.9 INJECTION, SOLUTION INTRAVENOUS at 08:11

## 2017-11-07 RX ADMIN — CARBAMAZEPINE 400 MG: 200 TABLET ORAL at 12:11

## 2017-11-07 RX ADMIN — POTASSIUM CHLORIDE 40 MEQ: 20 SOLUTION ORAL at 06:11

## 2017-11-07 RX ADMIN — ACETAMINOPHEN 1000 MG: 10 INJECTION, SOLUTION INTRAVENOUS at 03:11

## 2017-11-07 RX ADMIN — ONDANSETRON 8 MG: 2 INJECTION INTRAMUSCULAR; INTRAVENOUS at 01:11

## 2017-11-07 RX ADMIN — OLANZAPINE 20 MG: 5 TABLET, FILM COATED ORAL at 12:11

## 2017-11-07 RX ADMIN — CARBAMAZEPINE 200 MG: 200 TABLET ORAL at 06:11

## 2017-11-07 RX ADMIN — SODIUM FERRIC GLUCONATE COMPLEX 125 MG: 12.5 INJECTION INTRAVENOUS at 11:11

## 2017-11-07 RX ADMIN — CLONAZEPAM 1 MG: 1 TABLET ORAL at 04:11

## 2017-11-07 RX ADMIN — PROMETHAZINE HYDROCHLORIDE 25 MG: 25 TABLET ORAL at 01:11

## 2017-11-07 RX ADMIN — DIPHENHYDRAMINE HYDROCHLORIDE 25 MG: 25 CAPSULE ORAL at 11:11

## 2017-11-07 RX ADMIN — PROMETHAZINE HYDROCHLORIDE 25 MG: 25 TABLET ORAL at 04:11

## 2017-11-07 RX ADMIN — HYDROCODONE BITARTRATE AND ACETAMINOPHEN 1 TABLET: 5; 325 TABLET ORAL at 12:11

## 2017-11-07 RX ADMIN — ALUMINUM HYDROXIDE, MAGNESIUM HYDROXIDE, AND SIMETHICONE 30 ML: 200; 200; 20 SUSPENSION ORAL at 01:11

## 2017-11-07 RX ADMIN — HYDROCODONE BITARTRATE AND ACETAMINOPHEN 1 TABLET: 5; 325 TABLET ORAL at 07:11

## 2017-11-07 NOTE — PLAN OF CARE
Problem: Patient Care Overview  Goal: Plan of Care Review  Outcome: Ongoing (interventions implemented as appropriate)  Pt on room air with 99% sats with Q6 PRN albuterol treatment. No treatment needed at this time

## 2017-11-07 NOTE — HPI
Pleasant 36 yo F whom I am familiar with>  She is POD 6 s/p ex lap and reduction with priamry repair of internal hernia. Returned to the ER overnight with abdominal pain. TERRANCE wallis n/v.  NOtes flatus and BM.

## 2017-11-07 NOTE — ED NOTES
Patient identifiers for Amaris Kruger checked and correct.  LOC: Patient is awake, alert, and aware of environment with an appropriate affect. Patient is oriented x 3 and speaking appropriately.  APPEARANCE: Patient appears uncomfortable. Patient is clean and well groomed, patient's clothing is properly fastened.  SKIN: The skin is warm and dry. Patient has normal skin turgor and moist mucus membrances. Vertical healing incision to the midline abdomen with 2 horizontal healing incision to the outer abdominal area, 1 large stitch remains in place to the incision on the left side that is healing.   MUSCULOSKELETAL: Patient is moving all extremities well, no obvious deformities noted. Pulses intact.   RESPIRATORY: Airway is open and patent. Respirations are spontaneous and non-labored with normal effort and rate.  CARDIAC: Patient has a normal rate and rhythm. No peripheral edema noted. Capillary refill < 3 seconds.  ABDOMEN: Distended. Bowel sounds active in all 4 quadrants. Soft and diffusely tender, with palpable mass at the top of the incision, midline abdomen, that is tender to palpation.  NEUROLOGICAL: PERRL. Facial expression is symmetrical. Hand grasps are equal bilaterally. Normal sensation in all extremities when touched with finger.  Allergies reported:   Review of patient's allergies indicates:   Allergen Reactions    Haldol [haloperidol lactate] Other (See Comments)    Effexor xr [venlafaxine] Itching

## 2017-11-07 NOTE — ED PROVIDER NOTES
Encounter Date: 11/6/2017    SCRIBE #1 NOTE: I, Jung Aguilar, am scribing for, and in the presence of, Dr. Colbert.       History     Chief Complaint   Patient presents with    Abdominal Pain     Surgery for twisted intestine at first of november and now having abdominal pain and distention       11/06/2017 8:37 PM     Chief complaint: abdominal pain      Amaris Kruger is a 37 y.o. female with a PMHx of psychiatric treatment and a past SHx of cholecystectomy, gastric bypass, and volvulus repair who presents to the ED with an onset of abdominal pain that began 3 days ago. She had surgery to corrected a twisted intestine on the 1st of November. She says she is currently in a personal care home for a 20 week stay while she participates in behavioral classes during the day. She states she came here tonight because her stomach is distended and it hurts in her upper gastric area. She states it has been going on for 3 days. She says she had 3 bowl movements today. She also has noticed her ankles are swollen. She states she went home the day after her surgery but did not experience any pain because she was given pain medication to take home. She has not taken any pain medication since her 2nd post-op day. She says the distention makes it hard to take a deep breath. She reports having a cough that began when she came home from her operation. She denies any burning during urination or increased urination.       The history is provided by the patient.     Review of patient's allergies indicates:   Allergen Reactions    Haldol [haloperidol lactate] Other (See Comments)    Effexor xr [venlafaxine] Itching     Past Medical History:   Diagnosis Date    Anxiety     Back pain     Bipolar 1 disorder     Bipolar 1 disorder     Gastric bypass status for obesity     H/O bariatric surgery 10/30/2017    Heavy menstrual bleeding 10/30/2017    Iron deficiency anemia following bariatric surgery 10/30/2017    Microcytic  anemia 10/30/2017     Past Surgical History:   Procedure Laterality Date     SECTION, CLASSIC      CHOLECYSTECTOMY      GASTRIC BYPASS      GASTRIC BYPASS       Family History   Problem Relation Age of Onset    Hypertension Paternal Uncle     Hypertension Mother     No Known Problems Father      Social History   Substance Use Topics    Smoking status: Current Every Day Smoker     Packs/day: 0.50     Years: 19.00     Types: Vaping with nicotine    Smokeless tobacco: Never Used      Comment: patient has tried with chantrix    Alcohol use No     Review of Systems   Constitutional: Negative for activity change, appetite change, chills, fatigue and fever.   Eyes: Negative for visual disturbance.   Respiratory: Positive for cough. Negative for apnea and shortness of breath.    Cardiovascular: Positive for leg swelling. Negative for chest pain and palpitations.   Gastrointestinal: Positive for abdominal distention and abdominal pain.   Genitourinary: Negative for difficulty urinating, dysuria and frequency.   Musculoskeletal: Negative for neck pain.   Skin: Negative for pallor and rash.   Neurological: Negative for headaches.   Hematological: Does not bruise/bleed easily.   Psychiatric/Behavioral: Negative for agitation.       Physical Exam     Initial Vitals [17]   BP Pulse Resp Temp SpO2   123/68 92 18 98.3 °F (36.8 °C) 100 %      MAP       86.33         Physical Exam    Nursing note and vitals reviewed.  Constitutional: She appears well-developed and well-nourished.   HENT:   Head: Normocephalic and atraumatic.   Eyes: Conjunctivae are normal.   Neck: Normal range of motion. Neck supple.   Cardiovascular: Normal rate, regular rhythm and normal heart sounds. Exam reveals no gallop and no friction rub.    No murmur heard.  Pulmonary/Chest: Effort normal and breath sounds normal. No respiratory distress. She has no wheezes. She has no rhonchi. She has no rales.   Abdominal: Soft. She exhibits  distension. There is tenderness (diffuse). There is no rebound and no guarding.   Midline scar healing without erythema and 2 wall incisions healing as well   Musculoskeletal: Normal range of motion.   Neurological: She is alert and oriented to person, place, and time.   Skin: Skin is warm and dry. No erythema.   Psychiatric: She has a normal mood and affect.         ED Course   Procedures  Labs Reviewed - No data to display          Medical Decision Making:   History:   Old Medical Records: I decided to obtain old medical records.  Independently Interpreted Test(s):   I have ordered and independently interpreted X-rays - see summary below.       <> Summary of X-Ray Reading(s): chest x-ray interpreted by me reveals no infiltrates, effusions or mediastinal widening  Clinical Tests:   Lab Tests: Ordered and Reviewed  Radiological Study: Ordered and Reviewed  ED Management:  Amaris Kruger is a 37 y.o. female who presents with  three-day history of abdominal pain.  CT of the abdomen and pelvis reveals dilated small bowel loops which most likely reflects normal postoperative changes but cannot exclude ileus or early partial small bowel obstruction.  She will be admitted for observation.  She also reports a cough.  Chest x-ray fails to demonstrate any evidence of pneumonia.       APC / Resident Notes:   I, Dr. Eddy Colbert III, personally performed the services described in this documentation. All medical record entries made by the scribe were at my direction and in my presence.  I have reviewed the chart and agree that the record reflects my personal performance and is accurate and complete. Eddy Colbert III, MD.  11:53 PM 11/06/2017       Scribe Attestation:   Scribe #1: I performed the above scribed service and the documentation accurately describes the services I performed. I attest to the accuracy of the note.            ED Course      Clinical Impression:   Diagnoses of Cough and Abdominal pain were  pertinent to this visit.                           Eddy Colbert III, MD  11/06/17 0165

## 2017-11-07 NOTE — ED NOTES
Pt c/o tearing sensation in the abdomen after lifting arms above head for CT. MD updated on pt's complaint of pain, awaiting further orders

## 2017-11-07 NOTE — ASSESSMENT & PLAN NOTE
Health hazards associated with cigarette smoking were reviewed with patient and cessation was encouraged. Nicotine replacement and counseling options were discussed.  Spent 3 minutes counseling on cessation, patient not interested in cessation and refuses nicotine patch.

## 2017-11-07 NOTE — ASSESSMENT & PLAN NOTE
Since stopping pain meds.  CT (non-con) obtained and ? Partial SBO vs. Ileus.  Dr. Randolph was consulted from ED, felt patient could be discharged with close f/u.  However, she did not feel comfortable going home and was admitted for observation. Will continue oral pain medication and PRN toradol. Dr. Randolph to see tomorrow AM.  Consider contrasted CT scan.  She is INSISTENT on a full liquid diet at the least, discussed implications of continued oral intake if obstructed.  She again insists on eating.

## 2017-11-07 NOTE — SUBJECTIVE & OBJECTIVE
No current facility-administered medications on file prior to encounter.      Current Outpatient Prescriptions on File Prior to Encounter   Medication Sig    albuterol 90 mcg/actuation inhaler Inhale 2 puffs into the lungs every 6 (six) hours as needed for Wheezing. Rescue    atomoxetine (STRATTERA) 60 MG capsule Take 60 mg by mouth every morning.     carBAMazepine (TEGRETOL) 100 mg chewable tablet Take 4 tablets (400 mg total) by mouth every evening.    carbamazepine (TEGRETOL) 200 mg tablet Take 200 mg by mouth every morning.     clonazePAM (KLONOPIN) 1 MG tablet Take 1 tablet (1 mg total) by mouth 3 (three) times daily as needed for Anxiety.    dicyclomine (BENTYL) 10 MG capsule Take 2 capsules (20 mg total) by mouth every 6 (six) hours as needed (abd pain).    doxepin (SINEQUAN) 150 MG Cap Take 150 mg by mouth nightly as needed.    gabapentin (NEURONTIN) 400 MG capsule Take 400 mg by mouth 3 (three) times daily.     hydrocodone-acetaminophen 5-325mg (NORCO) 5-325 mg per tablet 1-2 tabs every 4-6 hrs prn pain    ketorolac (TORADOL) 10 mg tablet Take 1 tablet (10 mg total) by mouth every 4 to 6 hours as needed for Pain.    mupirocin (BACTROBAN) 2 % ointment 1 g by Nasal route 2 (two) times daily.    olanzapine (ZYPREXA) 20 MG tablet Take 20 mg by mouth every evening.    olanzapine (ZYPREXA) 5 MG tablet Take 5 mg by mouth every morning.     senna-docusate 8.6-50 mg (PERICOLACE) 8.6-50 mg per tablet Take 2 tablets by mouth 2 (two) times daily.       Review of patient's allergies indicates:   Allergen Reactions    Haldol [haloperidol lactate] Other (See Comments)    Effexor xr [venlafaxine] Itching       Past Medical History:   Diagnosis Date    Anxiety     Back pain     Bipolar 1 disorder     Bipolar 1 disorder     Gastric bypass status for obesity     H/O bariatric surgery 10/30/2017    Heavy menstrual bleeding 10/30/2017    Iron deficiency anemia following bariatric surgery 10/30/2017     Microcytic anemia 10/30/2017     Past Surgical History:   Procedure Laterality Date     SECTION, CLASSIC      CHOLECYSTECTOMY      GASTRIC BYPASS      GASTRIC BYPASS      intestinal tortion correction  2017     Family History     Problem Relation (Age of Onset)    Hypertension Paternal Uncle, Mother    No Known Problems Father        Social History Main Topics    Smoking status: Current Every Day Smoker     Packs/day: 0.50     Years: 19.00     Types: Vaping with nicotine    Smokeless tobacco: Never Used      Comment: patient has tried with chantrix    Alcohol use No    Drug use: No    Sexual activity: Yes     Birth control/ protection: None     Review of Systems   Constitutional: Negative for activity change, appetite change, fever and unexpected weight change.   Respiratory: Negative for chest tightness, shortness of breath and wheezing.    Cardiovascular: Negative for chest pain.   Gastrointestinal: Positive for abdominal pain. Negative for abdominal distention, anal bleeding, blood in stool, constipation, diarrhea, nausea, rectal pain and vomiting.   Genitourinary: Negative for difficulty urinating, dysuria and frequency.   Skin: Negative for wound.   Neurological: Negative for dizziness.   Hematological: Negative for adenopathy.   Psychiatric/Behavioral: Negative for agitation.     Objective:     Vital Signs (Most Recent):  Temp: 97.8 °F (36.6 °C) (17)  Pulse: 81 (17)  Resp: 16 (17)  BP: 120/67 (17)  SpO2: 98 % (17) Vital Signs (24h Range):  Temp:  [97.8 °F (36.6 °C)-98.8 °F (37.1 °C)] 97.8 °F (36.6 °C)  Pulse:  [72-92] 81  Resp:  [16-18] 16  SpO2:  [97 %-100 %] 98 %  BP: (103-123)/(61-68) 120/67     Weight: 77.7 kg (171 lb 4.8 oz)  Body mass index is 28.95 kg/m².    Physical Exam   Constitutional: She is oriented to person, place, and time. She appears well-developed and well-nourished.   HENT:   Head: Normocephalic and atraumatic.    Eyes: Pupils are equal, round, and reactive to light.   Neck: Normal range of motion. Neck supple. No tracheal deviation present. No thyromegaly present.   Cardiovascular: Normal rate, regular rhythm and normal heart sounds.    No murmur heard.  Pulmonary/Chest: Effort normal and breath sounds normal. She exhibits no tenderness.   Abdominal: Soft. Bowel sounds are normal. She exhibits no distension, no abdominal bruit, no pulsatile midline mass and no mass. There is no hepatosplenomegaly. There is no tenderness. There is no rigidity, no rebound, no guarding, no tenderness at McBurney's point and negative Disla's sign. No hernia. Hernia confirmed negative in the ventral area.   Genitourinary: Rectum normal.   Musculoskeletal: Normal range of motion.   Neurological: She is alert and oriented to person, place, and time.   Skin: Skin is warm. No rash noted. No erythema.   Psychiatric: She has a normal mood and affect.   Vitals reviewed.      Significant Labs:  CBC:   Recent Labs  Lab 11/07/17  0424   WBC 5.60   RBC 3.57*   HGB 7.7*   HCT 24.5*      MCV 69*   MCH 21.5*   MCHC 31.4*     BMP:   Recent Labs  Lab 11/07/17  0424   GLU 73      K 3.3*   *   CO2 22*   BUN 4*   CREATININE 0.5   CALCIUM 8.1*   MG 1.7       Significant Diagnostics:  CT scan reviewed mild dilation of small bowel.  Air and stool in colon.  Suggestive of ileus

## 2017-11-07 NOTE — PROGRESS NOTES
Call Dr Sumner's office to request that pt's 11/27 appt be rescheduled sooner; appt is now scheduled for tomorrow at 2:30.

## 2017-11-07 NOTE — PLAN OF CARE
11/07/17 1239   Final Note   Assessment Type Final Discharge Note   Discharge Disposition Home

## 2017-11-07 NOTE — ASSESSMENT & PLAN NOTE
Abdominal pain secondary to recent surgery.  No signs of acute obstruction and pt having bowel function this AM.  No surgical plans at present.  Will sign off.

## 2017-11-07 NOTE — CONSULTS
[facility]  General Surgery  Consult Note    Patient Name: Amaris Kruger  MRN: [id number]  Code Status: Full Code  Admission Date: 11/6/2017  Hospital Length of Stay: 0 days  Attending Physician: Larry Ramos MD  Primary Care Provider: Primary Doctor No    Patient information was obtained from patient and ER records.     Inpatient consult to General Surgery  Consult performed by: THOM MONTGOMERY  Consult ordered by: MAYELA GONZALEZ  Reason for consult: abdominal pain        Subjective:     Principal Problem: Abdominal pain    History of Present Illness: Pleasant 38 yo F whom I am familiar with>  She is POD 6 s/p ex lap and reduction with priamry repair of internal hernia. Returned to the ER overnight with abdominal pain. D enies n/v.  NOtes flatus and BM.     No current facility-administered medications on file prior to encounter.      Current Outpatient Prescriptions on File Prior to Encounter   Medication Sig    albuterol 90 mcg/actuation inhaler Inhale 2 puffs into the lungs every 6 (six) hours as needed for Wheezing. Rescue    atomoxetine (STRATTERA) 60 MG capsule Take 60 mg by mouth every morning.     carBAMazepine (TEGRETOL) 100 mg chewable tablet Take 4 tablets (400 mg total) by mouth every evening.    carbamazepine (TEGRETOL) 200 mg tablet Take 200 mg by mouth every morning.     clonazePAM (KLONOPIN) 1 MG tablet Take 1 tablet (1 mg total) by mouth 3 (three) times daily as needed for Anxiety.    dicyclomine (BENTYL) 10 MG capsule Take 2 capsules (20 mg total) by mouth every 6 (six) hours as needed (abd pain).    doxepin (SINEQUAN) 150 MG Cap Take 150 mg by mouth nightly as needed.    gabapentin (NEURONTIN) 400 MG capsule Take 400 mg by mouth 3 (three) times daily.     hydrocodone-acetaminophen 5-325mg (NORCO) 5-325 mg per tablet 1-2 tabs every 4-6 hrs prn pain    ketorolac (TORADOL) 10 mg tablet Take 1 tablet (10 mg total) by mouth every 4 to 6 hours as needed  for Pain.    mupirocin (BACTROBAN) 2 % ointment 1 g by Nasal route 2 (two) times daily.    olanzapine (ZYPREXA) 20 MG tablet Take 20 mg by mouth every evening.    olanzapine (ZYPREXA) 5 MG tablet Take 5 mg by mouth every morning.     senna-docusate 8.6-50 mg (PERICOLACE) 8.6-50 mg per tablet Take 2 tablets by mouth 2 (two) times daily.       Review of patient's allergies indicates:   Allergen Reactions    Haldol [haloperidol lactate] Other (See Comments)    Effexor xr [venlafaxine] Itching       Past Medical History:   Diagnosis Date    Anxiety     Back pain     Bipolar 1 disorder     Bipolar 1 disorder     Gastric bypass status for obesity     H/O bariatric surgery 10/30/2017    Heavy menstrual bleeding 10/30/2017    Iron deficiency anemia following bariatric surgery 10/30/2017    Microcytic anemia 10/30/2017     Past Surgical History:   Procedure Laterality Date     SECTION, CLASSIC      CHOLECYSTECTOMY      GASTRIC BYPASS      GASTRIC BYPASS      intestinal tortion correction  2017     Family History     Problem Relation (Age of Onset)    Hypertension Paternal Uncle, Mother    No Known Problems Father        Social History Main Topics    Smoking status: Current Every Day Smoker     Packs/day: 0.50     Years: 19.00     Types: Vaping with nicotine    Smokeless tobacco: Never Used      Comment: patient has tried with chantrix    Alcohol use No    Drug use: No    Sexual activity: Yes     Birth control/ protection: None     Review of Systems   Constitutional: Negative for activity change, appetite change, fever and unexpected weight change.   Respiratory: Negative for chest tightness, shortness of breath and wheezing.    Cardiovascular: Negative for chest pain.   Gastrointestinal: Positive for abdominal pain. Negative for abdominal distention, anal bleeding, blood in stool, constipation, diarrhea, nausea, rectal pain and vomiting.   Genitourinary: Negative for difficulty  urinating, dysuria and frequency.   Skin: Negative for wound.   Neurological: Negative for dizziness.   Hematological: Negative for adenopathy.   Psychiatric/Behavioral: Negative for agitation.     Objective:     Vital Signs (Most Recent):  Temp: 97.8 °F (36.6 °C) (11/07/17 0727)  Pulse: 81 (11/07/17 0727)  Resp: 16 (11/07/17 0727)  BP: 120/67 (11/07/17 0727)  SpO2: 98 % (11/07/17 0727) Vital Signs (24h Range):  Temp:  [97.8 °F (36.6 °C)-98.8 °F (37.1 °C)] 97.8 °F (36.6 °C)  Pulse:  [72-92] 81  Resp:  [16-18] 16  SpO2:  [97 %-100 %] 98 %  BP: (103-123)/(61-68) 120/67     Weight: 77.7 kg (171 lb 4.8 oz)  Body mass index is 28.95 kg/m².    Physical Exam   Constitutional: She is oriented to person, place, and time. She appears well-developed and well-nourished.   HENT:   Head: Normocephalic and atraumatic.   Eyes: Pupils are equal, round, and reactive to light.   Neck: Normal range of motion. Neck supple. No tracheal deviation present. No thyromegaly present.   Cardiovascular: Normal rate, regular rhythm and normal heart sounds.    No murmur heard.  Pulmonary/Chest: Effort normal and breath sounds normal. She exhibits no tenderness.   Abdominal: Soft. Bowel sounds are normal. She exhibits no distension, no abdominal bruit, no pulsatile midline mass and no mass. There is no hepatosplenomegaly. There is no tenderness. There is no rigidity, no rebound, no guarding, no tenderness at McBurney's point and negative Disla's sign. No hernia. Hernia confirmed negative in the ventral area.   Genitourinary: Rectum normal.   Musculoskeletal: Normal range of motion.   Neurological: She is alert and oriented to person, place, and time.   Skin: Skin is warm. No rash noted. No erythema.   Psychiatric: She has a normal mood and affect.   Vitals reviewed.      Significant Labs:  CBC:   Recent Labs  Lab 11/07/17  0424   WBC 5.60   RBC 3.57*   HGB 7.7*   HCT 24.5*      MCV 69*   MCH 21.5*   MCHC 31.4*     BMP:   Recent Labs  Lab  11/07/17  0424   GLU 73      K 3.3*   *   CO2 22*   BUN 4*   CREATININE 0.5   CALCIUM 8.1*   MG 1.7       Significant Diagnostics:  CT scan reviewed mild dilation of small bowel.  Air and stool in colon.  Suggestive of ileus    Assessment/Plan:     * Abdominal pain    Abdominal pain secondary to recent surgery.  No signs of acute obstruction and pt having bowel function this AM.  No surgical plans at present.  Will sign off.           VTE Risk Mitigation         Ordered     enoxaparin injection 40 mg  Daily     Route:  Subcutaneous        11/06/17 2346     Medium Risk of VTE  Once      11/06/17 2346          Thank you for your consult. I will sign off. Please contact us if you have any additional questions.    Jose Randolph MD  General Surgery  [facility]

## 2017-11-07 NOTE — ASSESSMENT & PLAN NOTE
H&H reviewed- stable.  S/p iron infusion last admission-- symptomatically improved.  Follow CBC.

## 2017-11-07 NOTE — HPI
"Amaris Kruger is a 37 y.o. female with a PMHx significant for bipolar disorder, anemia, and recent abdominal surgery (hernia causing intestinal volvulus s/p repair and washout).  She was admitted to the service of hospital medicine with abdominal pain.  She presented to the ED with an onset of abdominal pain that began yesterday. She states she hadsurgery to corrected a twisted intestine on the 1st of November. She says she is currently in a personal care home for a 20 week stay while she participates in behavioral classes during the day. She states she came here tonight because her stomach is distended and bloated.  She reports she has been out of pain medication since yesterday with resultant pain.  She describes the pain as a soreness and a "heavy pulling" sensation. She denies N/V, diarrhea, or constipation.  She says she had 3 bowl movements today. She also has noticed her ankles are swollen (L > R). She also complains of a cough that began when she came home from her operation. She continues to smoke.  Other pertinent medical history as below:  "

## 2017-11-07 NOTE — PLAN OF CARE
11/07/17 0117   Patient Assessment/Suction   Level of Consciousness (AVPU) alert   Respiratory Effort Normal;Unlabored   Expansion/Accessory Muscles/Retractions no use of accessory muscles   PRE-TX-O2-ETCO2   O2 Device (Oxygen Therapy) room air   SpO2 98 %   Pulse 72   Aerosol Therapy   $ Aerosol Therapy Charges PRN treatment not required   Respiratory Treatment Status PRN treatment not required   Incentive Spirometer   $ Incentive Spirometer Charges postop instruction   Predicted Level (mL) (Incentive Spirometer) 2500   Administration (Incentive Spirometer) done independently per patient   Number of Repetitions (Incentive Spirometer) 4   Level (mL) (Incentive Spirometer) 2000   Patient Tolerance fair   Pt tolerates RA well. No PRN treatment required at this time. Pt has fair effort with IS.

## 2017-11-07 NOTE — UM SECONDARY REVIEW
Physician Advisor External    Level of Care Issue    Approved Observation/ outpt for admit 11/6/17 per ehr md review.

## 2017-11-07 NOTE — H&P
"[facility] Hospital Medicine  History & Physical    Patient Name: Amaris Kruger  MRN: [id number]  Admission Date: 11/6/2017  Attending Physician: Namita Sagastume MD   Primary Care Provider: Primary Doctor No         Patient information was obtained from patient and ER records.     Subjective:     Principal Problem:Abdominal pain    Chief Complaint:   Chief Complaint   Patient presents with    Abdominal Pain     Surgery for twisted intestine at first of november and now having abdominal pain and distention        HPI: Amaris Kruger is a 37 y.o. female with a PMHx significant for bipolar disorder, anemia, and recent abdominal surgery (hernia causing intestinal volvulus s/p repair and washout).  She was admitted to the service of hospital medicine with abdominal pain.  She presented to the ED with an onset of abdominal pain that began yesterday. She states she hadsurgery to corrected a twisted intestine on the 1st of November. She says she is currently in a personal care home for a 20 week stay while she participates in behavioral classes during the day. She states she came here tonight because her stomach is distended and bloated.  She reports she has been out of pain medication since yesterday with resultant pain.  She describes the pain as a soreness and a "heavy pulling" sensation. She denies N/V, diarrhea, or constipation.  She says she had 3 bowl movements today. She also has noticed her ankles are swollen (L > R). She also complains of a cough that began when she came home from her operation. She continues to smoke.  Other pertinent medical history as below:    Past Medical History:   Diagnosis Date    Anxiety     Back pain     Bipolar 1 disorder     Bipolar 1 disorder     Gastric bypass status for obesity     H/O bariatric surgery 10/30/2017    Heavy menstrual bleeding 10/30/2017    Iron deficiency anemia following bariatric surgery 10/30/2017    Microcytic " anemia 10/30/2017       Past Surgical History:   Procedure Laterality Date     SECTION, CLASSIC      CHOLECYSTECTOMY      GASTRIC BYPASS      GASTRIC BYPASS      intestinal tortion correction  2017       Review of patient's allergies indicates:   Allergen Reactions    Haldol [haloperidol lactate] Other (See Comments)    Effexor xr [venlafaxine] Itching       No current facility-administered medications on file prior to encounter.      Current Outpatient Prescriptions on File Prior to Encounter   Medication Sig    albuterol 90 mcg/actuation inhaler Inhale 2 puffs into the lungs every 6 (six) hours as needed for Wheezing. Rescue    atomoxetine (STRATTERA) 60 MG capsule Take 60 mg by mouth every morning.     carBAMazepine (TEGRETOL) 100 mg chewable tablet Take 4 tablets (400 mg total) by mouth every evening.    carbamazepine (TEGRETOL) 200 mg tablet Take 200 mg by mouth every morning.     clonazePAM (KLONOPIN) 1 MG tablet Take 1 tablet (1 mg total) by mouth 3 (three) times daily as needed for Anxiety.    dicyclomine (BENTYL) 10 MG capsule Take 2 capsules (20 mg total) by mouth every 6 (six) hours as needed (abd pain).    doxepin (SINEQUAN) 150 MG Cap Take 150 mg by mouth nightly as needed.    gabapentin (NEURONTIN) 400 MG capsule Take 400 mg by mouth 3 (three) times daily.     hydrocodone-acetaminophen 5-325mg (NORCO) 5-325 mg per tablet 1-2 tabs every 4-6 hrs prn pain    ketorolac (TORADOL) 10 mg tablet Take 1 tablet (10 mg total) by mouth every 4 to 6 hours as needed for Pain.    mupirocin (BACTROBAN) 2 % ointment 1 g by Nasal route 2 (two) times daily.    olanzapine (ZYPREXA) 20 MG tablet Take 20 mg by mouth every evening.    olanzapine (ZYPREXA) 5 MG tablet Take 5 mg by mouth every morning.     senna-docusate 8.6-50 mg (PERICOLACE) 8.6-50 mg per tablet Take 2 tablets by mouth 2 (two) times daily.     Family History     Problem Relation (Age of Onset)    Hypertension Paternal  Uncle, Mother    No Known Problems Father        Social History Main Topics    Smoking status: Current Every Day Smoker     Packs/day: 0.50     Years: 19.00     Types: Vaping with nicotine    Smokeless tobacco: Never Used      Comment: patient has tried with chantrix    Alcohol use No    Drug use: No    Sexual activity: Yes     Birth control/ protection: None     Review of Systems   Constitutional: Negative for activity change, appetite change, chills, fatigue and fever.   HENT: Negative for congestion, postnasal drip, sore throat and trouble swallowing.    Eyes: Negative for visual disturbance.   Respiratory: Positive for cough. Negative for chest tightness, shortness of breath and wheezing.    Cardiovascular: Positive for leg swelling. Negative for chest pain and palpitations.   Gastrointestinal: Positive for abdominal distention and abdominal pain. Negative for constipation, diarrhea, nausea and vomiting.   Genitourinary: Negative for dysuria, flank pain and hematuria.   Musculoskeletal: Negative for arthralgias and back pain.   Neurological: Negative for dizziness, weakness, light-headedness and headaches.   Psychiatric/Behavioral: Negative for confusion. The patient is not nervous/anxious.      Objective:     Vital Signs (Most Recent):  Temp: 98.3 °F (36.8 °C) (11/06/17 2355)  Pulse: 88 (11/06/17 2355)  Resp: 16 (11/06/17 2355)  BP: 123/68 (11/06/17 1948)  SpO2: 98 % (11/06/17 2355) Vital Signs (24h Range):  Temp:  [98.3 °F (36.8 °C)] 98.3 °F (36.8 °C)  Pulse:  [88-92] 88  Resp:  [16-18] 16  SpO2:  [98 %-100 %] 98 %  BP: (123)/(68) 123/68     Weight: 77.7 kg (171 lb 4.8 oz)  Body mass index is 28.95 kg/m².    Physical Exam   Constitutional: She is oriented to person, place, and time. She appears well-developed and well-nourished. No distress.   HENT:   Head: Normocephalic and atraumatic.   Eyes: Conjunctivae and EOM are normal. Pupils are equal, round, and reactive to light.   Neck: Normal range of motion.  Neck supple. No thyromegaly present.   Cardiovascular: Normal rate, regular rhythm, normal heart sounds and intact distal pulses.    No murmur heard.  Pulmonary/Chest: Effort normal and breath sounds normal. No respiratory distress. She has no wheezes. She has no rales.   Abdominal: Soft. Bowel sounds are normal. She exhibits distension. She exhibits no mass. There is tenderness (mild epigastric tenderness). There is no guarding.   Midline incision well approximated with minimal erythema at wound borders.  Lap incisions x 2 well closed without drainage noted.   Neurological: She is alert and oriented to person, place, and time. No cranial nerve deficit.   Skin: Skin is warm and dry. Capillary refill takes less than 2 seconds. No erythema.   Psychiatric: She has a normal mood and affect. Her behavior is normal. Judgment and thought content normal.        Significant Labs:   CBC:   Recent Labs  Lab 11/06/17 2110   WBC 9.60   HGB 8.1*   HCT 26.2*        CMP:   Recent Labs  Lab 11/06/17 2110      K 3.4*      CO2 17*   GLU 88   BUN 6   CREATININE 0.6   CALCIUM 8.4*   PROT 6.2   ALBUMIN 2.8*   BILITOT 0.2   ALKPHOS 187*   AST 70*   *   ANIONGAP 12   EGFRNONAA >60     Urine Studies:   Recent Labs  Lab 11/06/17 2058   COLORU Yellow   APPEARANCEUA Clear   PHUR 6.0   SPECGRAV <=1.005*   PROTEINUA Negative   GLUCUA Negative   KETONESU Negative   BILIRUBINUA Negative   OCCULTUA 3+*   NITRITE Negative   UROBILINOGEN Negative   LEUKOCYTESUR Negative   RBCUA 5*   BACTERIA Rare   SQUAMEPITHEL 3       Significant Imaging:   CXR: No infiltrate or effusion noted (my interpretation).     CT A/P:   Limited evaluation secondary to lack of oral and intravenous contrast.  Dilated small bowel loops. Whether this represents a small bowel obstruction, ileus or other  abnormality can not be determined given the limitations of this study.  Small amount of free fluid within the pelvis with diffuse stranding of the  mesenteric fat.  The previously seen area of pneumatosis is not appreciated on the present study.    Assessment/Plan:     * Abdominal pain    Since stopping pain meds.  CT (non-con) obtained and ? Partial SBO vs. Ileus.  Dr. Randolph was consulted from ED, felt patient could be discharged with close f/u.  However, she did not feel comfortable going home and was admitted for observation. Will continue oral pain medication and PRN toradol. Dr. Randolph to see tomorrow AM.  Consider contrasted CT scan.  She is INSISTENT on a full liquid diet at the least, discussed implications of continued oral intake if obstructed.  She again insists on eating.        Iron deficiency anemia following bariatric surgery    H&H reviewed- stable.  S/p iron infusion last admission-- symptomatically improved.  Follow CBC.          Bipolar 1 disorder    Stable.  Continue outpatient mood stabilizing agents.  Monitor clinically.          Cigarette nicotine dependence without complication    Health hazards associated with cigarette smoking were reviewed with patient and cessation was encouraged. Nicotine replacement and counseling options were discussed.  Spent 3 minutes counseling on cessation, patient not interested in cessation and refuses nicotine patch.       Cough    No evidence of PNA on my review of CXR.  Utilize cough suppressant, IS, encouraged smoking cessation.          Leg swelling    Check venous U/S to r/o DVT though in discussing this seems to be chronic issue.     Transaminitis       Likely secondary to recent abdominal surgery.  Trend.          VTE Risk Mitigation         Ordered     enoxaparin injection 40 mg  Daily     Route:  Subcutaneous        11/06/17 2346     Medium Risk of VTE  Once      11/06/17 2346             Chioma Naik NP  Department of Hospital Medicine   [facility]

## 2017-11-07 NOTE — SUBJECTIVE & OBJECTIVE
Past Medical History:   Diagnosis Date    Anxiety     Back pain     Bipolar 1 disorder     Bipolar 1 disorder     Gastric bypass status for obesity     H/O bariatric surgery 10/30/2017    Heavy menstrual bleeding 10/30/2017    Iron deficiency anemia following bariatric surgery 10/30/2017    Microcytic anemia 10/30/2017       Past Surgical History:   Procedure Laterality Date     SECTION, CLASSIC      CHOLECYSTECTOMY      GASTRIC BYPASS      GASTRIC BYPASS      intestinal tortion correction  2017       Review of patient's allergies indicates:   Allergen Reactions    Haldol [haloperidol lactate] Other (See Comments)    Effexor xr [venlafaxine] Itching       No current facility-administered medications on file prior to encounter.      Current Outpatient Prescriptions on File Prior to Encounter   Medication Sig    albuterol 90 mcg/actuation inhaler Inhale 2 puffs into the lungs every 6 (six) hours as needed for Wheezing. Rescue    atomoxetine (STRATTERA) 60 MG capsule Take 60 mg by mouth every morning.     carBAMazepine (TEGRETOL) 100 mg chewable tablet Take 4 tablets (400 mg total) by mouth every evening.    carbamazepine (TEGRETOL) 200 mg tablet Take 200 mg by mouth every morning.     clonazePAM (KLONOPIN) 1 MG tablet Take 1 tablet (1 mg total) by mouth 3 (three) times daily as needed for Anxiety.    dicyclomine (BENTYL) 10 MG capsule Take 2 capsules (20 mg total) by mouth every 6 (six) hours as needed (abd pain).    doxepin (SINEQUAN) 150 MG Cap Take 150 mg by mouth nightly as needed.    gabapentin (NEURONTIN) 400 MG capsule Take 400 mg by mouth 3 (three) times daily.     hydrocodone-acetaminophen 5-325mg (NORCO) 5-325 mg per tablet 1-2 tabs every 4-6 hrs prn pain    ketorolac (TORADOL) 10 mg tablet Take 1 tablet (10 mg total) by mouth every 4 to 6 hours as needed for Pain.    mupirocin (BACTROBAN) 2 % ointment 1 g by Nasal route 2 (two) times daily.    olanzapine (ZYPREXA)  20 MG tablet Take 20 mg by mouth every evening.    olanzapine (ZYPREXA) 5 MG tablet Take 5 mg by mouth every morning.     senna-docusate 8.6-50 mg (PERICOLACE) 8.6-50 mg per tablet Take 2 tablets by mouth 2 (two) times daily.     Family History     Problem Relation (Age of Onset)    Hypertension Paternal Uncle, Mother    No Known Problems Father        Social History Main Topics    Smoking status: Current Every Day Smoker     Packs/day: 0.50     Years: 19.00     Types: Vaping with nicotine    Smokeless tobacco: Never Used      Comment: patient has tried with chantrix    Alcohol use No    Drug use: No    Sexual activity: Yes     Birth control/ protection: None     Review of Systems   Constitutional: Negative for activity change, appetite change, chills, fatigue and fever.   HENT: Negative for congestion, postnasal drip, sore throat and trouble swallowing.    Eyes: Negative for visual disturbance.   Respiratory: Positive for cough. Negative for chest tightness, shortness of breath and wheezing.    Cardiovascular: Positive for leg swelling. Negative for chest pain and palpitations.   Gastrointestinal: Positive for abdominal distention and abdominal pain. Negative for constipation, diarrhea, nausea and vomiting.   Genitourinary: Negative for dysuria, flank pain and hematuria.   Musculoskeletal: Negative for arthralgias and back pain.   Neurological: Negative for dizziness, weakness, light-headedness and headaches.   Psychiatric/Behavioral: Negative for confusion. The patient is not nervous/anxious.      Objective:     Vital Signs (Most Recent):  Temp: 98.3 °F (36.8 °C) (11/06/17 2355)  Pulse: 88 (11/06/17 2355)  Resp: 16 (11/06/17 2355)  BP: 123/68 (11/06/17 1948)  SpO2: 98 % (11/06/17 2355) Vital Signs (24h Range):  Temp:  [98.3 °F (36.8 °C)] 98.3 °F (36.8 °C)  Pulse:  [88-92] 88  Resp:  [16-18] 16  SpO2:  [98 %-100 %] 98 %  BP: (123)/(68) 123/68     Weight: 77.7 kg (171 lb 4.8 oz)  Body mass index is 28.95  kg/m².    Physical Exam   Constitutional: She is oriented to person, place, and time. She appears well-developed and well-nourished. No distress.   HENT:   Head: Normocephalic and atraumatic.   Eyes: Conjunctivae and EOM are normal. Pupils are equal, round, and reactive to light.   Neck: Normal range of motion. Neck supple. No thyromegaly present.   Cardiovascular: Normal rate, regular rhythm, normal heart sounds and intact distal pulses.    No murmur heard.  Pulmonary/Chest: Effort normal and breath sounds normal. No respiratory distress. She has no wheezes. She has no rales.   Abdominal: Soft. Bowel sounds are normal. She exhibits distension. She exhibits no mass. There is tenderness (mild epigastric tenderness). There is no guarding.   Midline incision well approximated with minimal erythema at wound borders.  Lap incisions x 2 well closed without drainage noted.   Neurological: She is alert and oriented to person, place, and time. No cranial nerve deficit.   Skin: Skin is warm and dry. Capillary refill takes less than 2 seconds. No erythema.   Psychiatric: She has a normal mood and affect. Her behavior is normal. Judgment and thought content normal.        Significant Labs:   CBC:   Recent Labs  Lab 11/06/17 2110   WBC 9.60   HGB 8.1*   HCT 26.2*        CMP:   Recent Labs  Lab 11/06/17 2110      K 3.4*      CO2 17*   GLU 88   BUN 6   CREATININE 0.6   CALCIUM 8.4*   PROT 6.2   ALBUMIN 2.8*   BILITOT 0.2   ALKPHOS 187*   AST 70*   *   ANIONGAP 12   EGFRNONAA >60     Urine Studies:   Recent Labs  Lab 11/06/17 2058   COLORU Yellow   APPEARANCEUA Clear   PHUR 6.0   SPECGRAV <=1.005*   PROTEINUA Negative   GLUCUA Negative   KETONESU Negative   BILIRUBINUA Negative   OCCULTUA 3+*   NITRITE Negative   UROBILINOGEN Negative   LEUKOCYTESUR Negative   RBCUA 5*   BACTERIA Rare   SQUAMEPITHEL 3       Significant Imaging:   CXR: No infiltrate or effusion noted (my interpretation).     CT A/P:    Limited evaluation secondary to lack of oral and intravenous contrast.  Dilated small bowel loops. Whether this represents a small bowel obstruction, ileus or other  abnormality can not be determined given the limitations of this study.  Small amount of free fluid within the pelvis with diffuse stranding of the mesenteric fat.  The previously seen area of pneumatosis is not appreciated on the present study.

## 2017-11-08 RX ORDER — OXYCODONE AND ACETAMINOPHEN 5; 325 MG/1; MG/1
2 TABLET ORAL EVERY 6 HOURS PRN
Qty: 30 TABLET | Refills: 0 | Status: SHIPPED | OUTPATIENT
Start: 2017-11-08

## 2017-11-08 NOTE — DISCHARGE SUMMARY
"[facility] Hospital Medicine  Discharge Summary      Patient Name: Amaris Kruger  MRN: [id number]  Admission Date: 11/6/2017  Hospital Length of Stay: 0 days  Discharge Date and Time: 11/7/2017  5:08 PM  Attending Physician: Asya att. providers found   Discharging Provider: Selina Browne NP  Primary Care Provider: Shaq Carvajal NP      HPI:   Amaris Kruger is a 37 y.o. female with a PMHx significant for bipolar disorder, anemia, and recent abdominal surgery (hernia causing intestinal volvulus s/p repair and washout).  She was admitted to the service of hospital medicine with abdominal pain.  She presented to the ED with an onset of abdominal pain that began yesterday. She states she hadsurgery to corrected a twisted intestine on the 1st of November. She says she is currently in a personal care home for a 20 week stay while she participates in behavioral classes during the day. She states she came here tonight because her stomach is distended and bloated.  She reports she has been out of pain medication since yesterday with resultant pain.  She describes the pain as a soreness and a "heavy pulling" sensation. She denies N/V, diarrhea, or constipation.  She says she had 3 bowl movements today. She also has noticed her ankles are swollen (L > R). She also complains of a cough that began when she came home from her operation. She continues to smoke.  Other pertinent medical history as below:    * No surgery found *      Hospital Course:   Patient underwent CT abdomen which demonstrated  Interval laparotomy and internal hernia reduction with resolution of previously noted volvulus. Complete results below. General surgery consulted. No surgical intervention required and she was cleared for DC from surgical standpoint. She received antiemetics and analgesics during admission. Potassium replaced with oral supplementation.  She was given Ferrlecit IV x 1 dose. She was noted to have " LE swelling and underwent LE venous US which is positive for DVT.  Patient unable to take OAC due to contraindications with tegretol. Will need to be placed on Lovenox pending hematology and psychiatry follow up. Long discussion with patient regarding outpatient POC.      Consults:   Consults         Status Ordering Provider     Inpatient consult to General Surgery  Once     Provider:  Jose Randolph MD    Completed JEANEITMAYELA          No new Assessment & Plan notes have been filed under this hospital service since the last note was generated.  Service: Hospital Medicine    Final Active Diagnoses:    Diagnosis Date Noted POA    PRINCIPAL PROBLEM:  Abdominal pain [R10.9] 11/06/2017 Yes    Cough [R05] 11/07/2017 Yes    Leg swelling [M79.89] 11/07/2017 Yes    Transaminitis [R74.0] 11/07/2017 Yes    Cigarette nicotine dependence without complication [F17.210] 11/01/2017 Yes    Bipolar 1 disorder [F31.9] 11/01/2017 Yes    Iron deficiency anemia following bariatric surgery [D50.9] 10/30/2017 Yes      Problems Resolved During this Admission:    Diagnosis Date Noted Date Resolved POA       Discharged Condition: stable    Disposition: Home or Self Care    Follow Up:  Follow-up Information     Marquis Sumner MD On 11/8/2017.    Specialties:  Hematology, Hematology and Oncology, Oncology  Why:  @ 2:30; call if you can't make this appointment  Contact information:  [address]  [address]  [place] [address]  [phone]             Shaq Carvajal NP In 1 week.    Specialty:  Family Medicine  Why:  hospital follow up  Contact information:  [address]  [place] [address]  [phone]             Kamar Bolaños MD In 1 week.    Specialties:  Pain Medicine, Anesthesiology  Why:  hospital follow up  Contact information:  [address] [place] [address] DR  [address]  [place] [address]  [phone]             Jose Randolph MD In 1 week.    Specialties:  General Surgery, Colon and Rectal Surgery, Surgery  Why:  hospital  follow up  Contact information:  [address] [place] [address]  [phone]                 Patient Instructions:     Diet general     Activity as tolerated     Call MD for:  severe uncontrolled pain     Call MD for:  persistent nausea and vomiting or diarrhea     Call MD for:  redness, tenderness, or signs of infection (pain, swelling, redness, odor or green/yellow discharge around incision site)     Call MD for:  persistent dizziness, light-headedness, or visual disturbances         Significant Diagnostic Studies: Labs:   BMP:   Recent Labs  Lab 11/06/17 2110 11/07/17 0424   GLU 88 73    145   K 3.4* 3.3*    114*   CO2 17* 22*   BUN 6 4*   CREATININE 0.6 0.5   CALCIUM 8.4* 8.1*   MG  --  1.7    and CMP   Recent Labs  Lab 11/06/17 2110 11/07/17 0424    145   K 3.4* 3.3*    114*   CO2 17* 22*   GLU 88 73   BUN 6 4*   CREATININE 0.6 0.5   CALCIUM 8.4* 8.1*   PROT 6.2 5.2*   ALBUMIN 2.8* 2.3*   BILITOT 0.2 0.2   ALKPHOS 187* 157*   AST 70* 55*   * 196*   ANIONGAP 12 9   ESTGFRAFRICA >60 >60   EGFRNONAA >60 >60     Radiology:   US Lower Extremity Veins Bilateral [[id number]] Resulted: 11/07/17 0902   Order Status: Completed Updated: 11/07/17 0902   Narrative:     FINDINGS:    Right and left common femoral and greater saphenous vein in the  groins,  femoral and popliteal veins show good, complete compression. There is good venous Doppler signal with spontaneous and phasic flow.  Right and left anterior tibial, peroneal veins and left posterior tibial vein and one of 2 right posterior tibial veins visualized appear patent..  One of the 2 right posterior tibial veins appears occluded without flow   Impression:             Deep venous thrombosis of one of the 2 paired left Posterior tibial veins right lower extremity.  No deep venous thrombosis more proximally in the visualized veins of either the right or the left lower extremity.          Report was called to the patients nurse  Vilma at the time of this dictation      Electronically signed by: TONY NOLAND MD  Date: 11/07/17  Time: 09:02    X-Ray Chest PA And Lateral [[id number]] Resulted: 11/07/17 0756   Order Status: Completed Updated: 11/07/17 0756   Narrative:     Comparison: 11/1/17    Technique: PA and lateral radiographs of the chest.    Findings: The cardiomediastinal silhouette is within normal limits in size and configuration.  The lungs are clear.  No alveolar consolidation, pleural effusion or pneumothorax.  The marcelina and great vessels are unremarkable.  The visualized airway is unremarkable. Surgical clips noted in the left upper quadrant.   Impression:       1.  Normal radiographic evaluation of the chest.      Electronically signed by: Marilyn Montemayor MD  Date: 11/07/17  Time: 07:56    CT Abdomen Pelvis Without Contrast [[id number]] Resulted: 11/07/17 0722   Order Status: Completed Updated: 11/07/17 0723   Narrative:     Comparison:11/1/17    Technique: Axial 5-mm images are reviewed from above the diaphragm to the proximal femora without contrast.  Coronal and sagittal reformatted images are reviewed.    Findings:There are mild patchy groundglass changes within the right lower lobe. The lung bases are otherwise clear.    Evaluating the solid organs is limited without intravenous contrast. Noncontrast evaluation of the liver is unremarkable. Cholecystectomy clips are noted. The spleen is unremarkable. The bilateral adrenal glands and pancreas are unremarkable. No dilated bile ducts are noted.    The kidneys are symmetric in size and contour. No hydroureteronephrosis or nephroureterolithiasis is identified.    Changes of Mynor-en-Y gastric bypass are identified. There are mildly dilated small bowel loops in the upper and lower abdomen with multiple air-fluid levels noted. No definite zone of transition is identified. There have been interval midline laparotomy changes. No sign of appendicitis is identified. The appendix  appears to be present. The previously noted findings of volvulus in the midabdomen are not identified on this examination. No pneumatosis or pneumoperitoneum is identified on this examination. No postoperative fluid collection to suggest abscess is identified. A small volume of fluid is seen in the dependent pelvis.    Moderate volume of scattered colonic fecal material and gas is seen throughout the colon.    There is a left adnexal cyst measuring approximately 4 cm which appears similar when compared to the prior study. Noncontrast evaluation the uterus and right ovary is unremarkable. The urinary bladder is moderately well-distended and otherwise unremarkable.    No abdominal or pelvic lymphadenopathy is seen. The aorta is non-aneurysmal.    No acute osseous abnormality is seen. Multilevel, mild degenerative disc disease.   Impression:       1.  Interval laparotomy and internal hernia reduction with resolution of previously noted volvulus. The small bowel loops are diffusely mildly dilated, also noting a moderate volume of colonic fecal material and gas, favored to represent postoperative ileus. Consider radiographic followup. No pneumatosis or pneumoperitoneum identified.    2. Mild patchy groundglass changes in the right lower lobe which are favored to represent atelectasis. Mild pneumonia is also possible.    3. Findings of prior Mynor-en-Y gastric bypass, cholecystectomy and stable left ovarian cyst also again noted.      In agreement with the preliminary [facility] report.         Electronically signed by: Marilyn Montemayor MD  Date: 11/07/17  Time: 07:22             Pending Diagnostic Studies:     None         Medications:  Reconciled Home Medications:   Discharge Medication List as of 11/7/2017  4:10 PM      START taking these medications    Details   enoxaparin (LOVENOX) 120 mg/0.8 mL Syrg Inject 0.5 mLs (80 mg total) into the skin every 12 (twelve) hours., Starting Tue 11/7/2017, Until Thu 12/7/2017, Normal       ferrous gluconate (FERGON) 324 MG tablet Take 1 tablet (324 mg total) by mouth 2 (two) times daily with meals., Starting Tue 11/7/2017, OTC         CONTINUE these medications which have CHANGED    Details   oxyCODONE-acetaminophen (PERCOCET) 5-325 mg per tablet Take 2 tablets by mouth every 6 (six) hours as needed for Pain., Starting Tue 11/7/2017, Print         CONTINUE these medications which have NOT CHANGED    Details   albuterol 90 mcg/actuation inhaler Inhale 2 puffs into the lungs every 6 (six) hours as needed for Wheezing. Rescue, Starting Mon 10/2/2017, Until Tue 10/2/2018, Normal      atomoxetine (STRATTERA) 60 MG capsule Take 60 mg by mouth every morning. , Historical Med      carBAMazepine (TEGRETOL) 100 mg chewable tablet Take 4 tablets (400 mg total) by mouth every evening., Starting Fri 11/3/2017, No Print      carbamazepine (TEGRETOL) 200 mg tablet Take 200 mg by mouth every morning. , Starting Fri 9/15/2017, Historical Med      clonazePAM (KLONOPIN) 1 MG tablet Take 1 tablet (1 mg total) by mouth 3 (three) times daily as needed for Anxiety., Starting Fri 11/3/2017, No Print      dicyclomine (BENTYL) 10 MG capsule Take 2 capsules (20 mg total) by mouth every 6 (six) hours as needed (abd pain)., Starting Fri 11/3/2017, Until Sun 12/3/2017, Normal      doxepin (SINEQUAN) 150 MG Cap Take 150 mg by mouth nightly as needed., Historical Med      gabapentin (NEURONTIN) 400 MG capsule Take 400 mg by mouth 3 (three) times daily. , Starting Fri 9/15/2017, Historical Med      hydrocodone-acetaminophen 5-325mg (NORCO) 5-325 mg per tablet 1-2 tabs every 4-6 hrs prn pain, Normal      ketorolac (TORADOL) 10 mg tablet Take 1 tablet (10 mg total) by mouth every 4 to 6 hours as needed for Pain., Starting Fri 11/3/2017, Normal      mupirocin (BACTROBAN) 2 % ointment 1 g by Nasal route 2 (two) times daily., Starting Fri 11/3/2017, Until Tue 11/7/2017, Normal      !! olanzapine (ZYPREXA) 20 MG tablet Take 20 mg by  mouth every evening., Historical Med      !! olanzapine (ZYPREXA) 5 MG tablet Take 5 mg by mouth every morning. , Starting Sat 9/2/2017, Historical Med      senna-docusate 8.6-50 mg (PERICOLACE) 8.6-50 mg per tablet Take 2 tablets by mouth 2 (two) times daily., Starting Fri 11/3/2017, OTC       !! - Potential duplicate medications found. Please discuss with provider.          Indwelling Lines/Drains at time of discharge:   Lines/Drains/Airways          No matching active lines, drains, or airways          Time spent on the discharge of patient: 45 minutes  Patient was seen and examined on the date of discharge and determined to be suitable for discharge.         Selina Browne NP  Department of Hospital Medicine  [facility]

## 2017-11-09 ENCOUNTER — TELEPHONE (OUTPATIENT)
Dept: MEDSURG UNIT | Facility: HOSPITAL | Age: 37
End: 2017-11-09

## 2017-11-09 LAB — BACTERIA SPEC ANAEROBE CULT: NORMAL

## 2017-11-13 RX ORDER — HEPARIN 100 UNIT/ML
500 SYRINGE INTRAVENOUS
Status: CANCELLED | OUTPATIENT
Start: 2017-11-13

## 2017-11-13 RX ORDER — SODIUM CHLORIDE 0.9 % (FLUSH) 0.9 %
10 SYRINGE (ML) INJECTION
Status: CANCELLED | OUTPATIENT
Start: 2017-11-13

## 2017-11-13 RX ORDER — DIPHENHYDRAMINE HYDROCHLORIDE 50 MG/ML
25 INJECTION INTRAMUSCULAR; INTRAVENOUS
Status: CANCELLED
Start: 2017-11-13

## 2017-11-20 RX ORDER — SODIUM CHLORIDE 0.9 % (FLUSH) 0.9 %
10 SYRINGE (ML) INJECTION
Status: CANCELLED | OUTPATIENT
Start: 2017-11-20

## 2017-11-20 RX ORDER — HEPARIN 100 UNIT/ML
500 SYRINGE INTRAVENOUS
Status: CANCELLED | OUTPATIENT
Start: 2017-11-20

## 2017-11-20 RX ORDER — DIPHENHYDRAMINE HYDROCHLORIDE 50 MG/ML
25 INJECTION INTRAMUSCULAR; INTRAVENOUS
Status: CANCELLED
Start: 2017-11-20

## 2017-12-11 RX ORDER — SODIUM CHLORIDE 0.9 % (FLUSH) 0.9 %
10 SYRINGE (ML) INJECTION
Status: CANCELLED | OUTPATIENT
Start: 2017-12-11

## 2017-12-11 RX ORDER — DIPHENHYDRAMINE HYDROCHLORIDE 50 MG/ML
25 INJECTION INTRAMUSCULAR; INTRAVENOUS
Status: CANCELLED
Start: 2017-12-11

## 2017-12-11 RX ORDER — HEPARIN 100 UNIT/ML
500 SYRINGE INTRAVENOUS
Status: CANCELLED | OUTPATIENT
Start: 2017-12-11

## 2017-12-18 RX ORDER — HEPARIN 100 UNIT/ML
500 SYRINGE INTRAVENOUS
Status: CANCELLED | OUTPATIENT
Start: 2017-12-18

## 2017-12-18 RX ORDER — DIPHENHYDRAMINE HYDROCHLORIDE 50 MG/ML
25 INJECTION INTRAMUSCULAR; INTRAVENOUS
Status: CANCELLED
Start: 2017-12-18

## 2017-12-18 RX ORDER — SODIUM CHLORIDE 0.9 % (FLUSH) 0.9 %
10 SYRINGE (ML) INJECTION
Status: CANCELLED | OUTPATIENT
Start: 2017-12-18

## 2018-02-06 ENCOUNTER — HOSPITAL ENCOUNTER (EMERGENCY)
Facility: HOSPITAL | Age: 38
Discharge: PSYCHIATRIC OR SUBSTANCE USE FACILITY | End: 2018-02-06
Attending: EMERGENCY MEDICINE
Payer: MEDICARE

## 2018-02-06 VITALS
BODY MASS INDEX: 29.19 KG/M2 | HEART RATE: 97 BPM | DIASTOLIC BLOOD PRESSURE: 60 MMHG | SYSTOLIC BLOOD PRESSURE: 101 MMHG | RESPIRATION RATE: 17 BRPM | HEIGHT: 64 IN | TEMPERATURE: 98 F | WEIGHT: 171 LBS | OXYGEN SATURATION: 99 %

## 2018-02-06 DIAGNOSIS — R45.851 SUICIDAL IDEATION: Primary | ICD-10-CM

## 2018-02-06 LAB
ALBUMIN SERPL BCP-MCNC: 3.4 G/DL
ALP SERPL-CCNC: 137 U/L
ALT SERPL W/O P-5'-P-CCNC: 76 U/L
AMPHET+METHAMPHET UR QL: NORMAL
ANION GAP SERPL CALC-SCNC: 11 MMOL/L
ANISOCYTOSIS BLD QL SMEAR: ABNORMAL
APAP SERPL-MCNC: <3 UG/ML
AST SERPL-CCNC: 29 U/L
B-HCG UR QL: NEGATIVE
BARBITURATES UR QL SCN>200 NG/ML: NEGATIVE
BASOPHILS # BLD AUTO: 0 K/UL
BASOPHILS NFR BLD: 0.5 %
BENZODIAZ UR QL SCN>200 NG/ML: NEGATIVE
BILIRUB SERPL-MCNC: 0.1 MG/DL
BILIRUB UR QL STRIP: NEGATIVE
BUN SERPL-MCNC: 23 MG/DL
BZE UR QL SCN: NEGATIVE
CALCIUM SERPL-MCNC: 8.9 MG/DL
CANNABINOIDS UR QL SCN: NORMAL
CHLORIDE SERPL-SCNC: 110 MMOL/L
CLARITY UR: CLEAR
CO2 SERPL-SCNC: 19 MMOL/L
COLOR UR: YELLOW
CREAT SERPL-MCNC: 0.7 MG/DL
CREAT UR-MCNC: 72.6 MG/DL
CTP QC/QA: YES
DIFFERENTIAL METHOD: ABNORMAL
EOSINOPHIL # BLD AUTO: 0 K/UL
EOSINOPHIL NFR BLD: 0.1 %
ERYTHROCYTE [DISTWIDTH] IN BLOOD BY AUTOMATED COUNT: 23 %
EST. GFR  (AFRICAN AMERICAN): >60 ML/MIN/1.73 M^2
EST. GFR  (NON AFRICAN AMERICAN): >60 ML/MIN/1.73 M^2
ETHANOL SERPL-MCNC: <10 MG/DL
GLUCOSE SERPL-MCNC: 58 MG/DL
GLUCOSE UR QL STRIP: NEGATIVE
HCT VFR BLD AUTO: 36.8 %
HGB BLD-MCNC: 11.7 G/DL
HGB UR QL STRIP: NEGATIVE
HYPOCHROMIA BLD QL SMEAR: ABNORMAL
KETONES UR QL STRIP: NEGATIVE
LEUKOCYTE ESTERASE UR QL STRIP: NEGATIVE
LYMPHOCYTES # BLD AUTO: 1.2 K/UL
LYMPHOCYTES NFR BLD: 22.4 %
MCH RBC QN AUTO: 24.8 PG
MCHC RBC AUTO-ENTMCNC: 31.9 G/DL
MCV RBC AUTO: 78 FL
METHADONE UR QL SCN>300 NG/ML: NEGATIVE
MONOCYTES # BLD AUTO: 0.7 K/UL
MONOCYTES NFR BLD: 12.3 %
NEUTROPHILS # BLD AUTO: 3.4 K/UL
NEUTROPHILS NFR BLD: 64.7 %
NITRITE UR QL STRIP: NEGATIVE
OPIATES UR QL SCN: NEGATIVE
OVALOCYTES BLD QL SMEAR: ABNORMAL
PCP UR QL SCN>25 NG/ML: NEGATIVE
PH UR STRIP: 6 [PH] (ref 5–8)
PLATELET # BLD AUTO: 229 K/UL
PLATELET BLD QL SMEAR: ABNORMAL
PMV BLD AUTO: 7.5 FL
POIKILOCYTOSIS BLD QL SMEAR: SLIGHT
POTASSIUM SERPL-SCNC: 3.8 MMOL/L
PROT SERPL-MCNC: 6.7 G/DL
PROT UR QL STRIP: NEGATIVE
RBC # BLD AUTO: 4.73 M/UL
SODIUM SERPL-SCNC: 140 MMOL/L
SP GR UR STRIP: 1.01 (ref 1–1.03)
TOXICOLOGY INFORMATION: NORMAL
URN SPEC COLLECT METH UR: NORMAL
UROBILINOGEN UR STRIP-ACNC: NEGATIVE EU/DL
WBC # BLD AUTO: 5.3 K/UL

## 2018-02-06 PROCEDURE — 80320 DRUG SCREEN QUANTALCOHOLS: CPT

## 2018-02-06 PROCEDURE — 99285 EMERGENCY DEPT VISIT HI MDM: CPT

## 2018-02-06 PROCEDURE — 85025 COMPLETE CBC W/AUTO DIFF WBC: CPT

## 2018-02-06 PROCEDURE — 25000003 PHARM REV CODE 250: Performed by: EMERGENCY MEDICINE

## 2018-02-06 PROCEDURE — 36415 COLL VENOUS BLD VENIPUNCTURE: CPT

## 2018-02-06 PROCEDURE — 81025 URINE PREGNANCY TEST: CPT | Performed by: EMERGENCY MEDICINE

## 2018-02-06 PROCEDURE — 80329 ANALGESICS NON-OPIOID 1 OR 2: CPT

## 2018-02-06 PROCEDURE — 80053 COMPREHEN METABOLIC PANEL: CPT

## 2018-02-06 PROCEDURE — 80307 DRUG TEST PRSMV CHEM ANLYZR: CPT

## 2018-02-06 PROCEDURE — 81003 URINALYSIS AUTO W/O SCOPE: CPT | Mod: 59

## 2018-02-06 RX ORDER — ALPRAZOLAM 1 MG/1
2 TABLET ORAL
Status: COMPLETED | OUTPATIENT
Start: 2018-02-06 | End: 2018-02-06

## 2018-02-06 RX ORDER — IBUPROFEN 200 MG
1 TABLET ORAL
Status: DISCONTINUED | OUTPATIENT
Start: 2018-02-06 | End: 2018-02-06 | Stop reason: HOSPADM

## 2018-02-06 RX ADMIN — ALPRAZOLAM 2 MG: 1 TABLET ORAL at 12:02

## 2018-02-06 NOTE — ED NOTES
Pt accepted at [facility] by Dr. Genaro Teran. [address] [place] [address]. Report can be called to [phone].

## 2018-02-06 NOTE — ED NOTES
Pt awake alert family at bedside sitter at door, pt co SI PTA, pt denies chest pain or sob, denies abd pain, denies fever denies n/v/d, pt came to ED voluntary today to get help

## 2018-02-06 NOTE — ED PROVIDER NOTES
"Encounter Date: 2018    SCRIBE #1 NOTE: I, Shey Chavis, am scribing for, and in the presence of, Dr. Colbert.       History     Chief Complaint   Patient presents with    Psychiatric Evaluation     " I need to be inpatient "    Suicidal    Anxiety     2018  11:53 AM     Chief Complaint: Psychiatric Evaluation     Amaris Kruger is a 37 y.o. female who is presenting to ED for psychiatric evaluation today. Pt states that she has thoughts of suicide since this morning and she believes that "she needs to be an inpatient at a psychiatric hospital." She notes that she is depressed because her ex- took her children and will not allow her to visit them. She also reports auditory hallucinations. She notes that she is compliant with her medications. Pt has a pmhx of anxiety; Bipolar 1 disorder; and Microcytic anemia. Pt has a pshx that includes cholecystectomy; gastric bypass; and intestinal tortion correction.       The history is provided by the patient.     Review of patient's allergies indicates:   Allergen Reactions    Haldol [haloperidol lactate] Other (See Comments)    Effexor xr [venlafaxine] Itching     Past Medical History:   Diagnosis Date    Anxiety     Back pain     Bipolar 1 disorder     Bipolar 1 disorder     Gastric bypass status for obesity     H/O bariatric surgery 10/30/2017    Heavy menstrual bleeding 10/30/2017    Iron deficiency anemia following bariatric surgery 10/30/2017    Microcytic anemia 10/30/2017     Past Surgical History:   Procedure Laterality Date     SECTION, CLASSIC      CHOLECYSTECTOMY      GASTRIC BYPASS      GASTRIC BYPASS      intestinal tortion correction  2017     Family History   Problem Relation Age of Onset    Hypertension Paternal Uncle     Hypertension Mother     No Known Problems Father      Social History   Substance Use Topics    Smoking status: Current Every Day Smoker     Packs/day: 0.50     Years: 19.00     Types: " Vaping with nicotine    Smokeless tobacco: Never Used      Comment: patient has tried with chantrix    Alcohol use No     Review of Systems   Constitutional: Negative for activity change, appetite change, chills, fatigue and fever.   Eyes: Negative for visual disturbance.   Respiratory: Negative for apnea and shortness of breath.    Cardiovascular: Negative for chest pain and palpitations.   Gastrointestinal: Negative for abdominal distention and abdominal pain.   Genitourinary: Negative for difficulty urinating.   Musculoskeletal: Negative for neck pain.   Skin: Negative for pallor and rash.   Neurological: Negative for headaches.   Hematological: Does not bruise/bleed easily.   Psychiatric/Behavioral: Positive for hallucinations and suicidal ideas. Negative for agitation.        Positive for depressed mood.        Physical Exam     Initial Vitals [02/06/18 1110]   BP Pulse Resp Temp SpO2   117/76 109 20 97.6 °F (36.4 °C) 98 %      MAP       89.67         Physical Exam    Nursing note and vitals reviewed.  Constitutional: She appears well-developed and well-nourished.   HENT:   Head: Normocephalic and atraumatic.   Eyes: Conjunctivae are normal.   Neck: Normal range of motion. Neck supple.   Cardiovascular: Normal rate, regular rhythm and normal heart sounds.   Pulmonary/Chest: Effort normal and breath sounds normal. No respiratory distress. She has no wheezes.   Abdominal: Soft. There is no tenderness.   Musculoskeletal: Normal range of motion.   Neurological: She is alert and oriented to person, place, and time.   Skin: Skin is warm and dry. No erythema.   Psychiatric: She has a normal mood and affect. Her speech is rapid and/or pressured.   Flight of ideas. Suicidal ideation with depressed mood.          ED Course   Procedures  Labs Reviewed   CBC W/ AUTO DIFFERENTIAL - Abnormal; Notable for the following:        Result Value    Hemoglobin 11.7 (*)     Hematocrit 36.8 (*)     MCV 78 (*)     MCH 24.8 (*)      MCHC 31.9 (*)     RDW 23.0 (*)     MPV 7.5 (*)     All other components within normal limits   COMPREHENSIVE METABOLIC PANEL - Abnormal; Notable for the following:     CO2 19 (*)     Glucose 58 (*)     BUN, Bld 23 (*)     Albumin 3.4 (*)     Alkaline Phosphatase 137 (*)     ALT 76 (*)     All other components within normal limits   ACETAMINOPHEN LEVEL - Abnormal; Notable for the following:     Acetaminophen (Tylenol), Serum <3.0 (*)     All other components within normal limits   DRUG SCREEN PANEL, URINE EMERGENCY   ALCOHOL,MEDICAL (ETHANOL)   URINALYSIS   POCT URINE PREGNANCY             Medical Decision Making:   Clinical Tests:   Lab Tests: Ordered and Reviewed  ED Management:  Amaris Kruger is a 37 y.o. female who presents with  suicidal ideation.  She has a history of bipolar disorder and exhibits signs of nicole with pressured speech and flight of ideas.  She reports that she is concerned about self injury.  I completed a PEC and she is transferred to a psychiatric facility.       APC / Resident Notes:   I, Dr. Eddy Colbert III, personally performed the services described in this documentation. All medical record entries made by the scribe were at my direction and in my presence.  I have reviewed the chart and agree that the record reflects my personal performance and is accurate and complete. Eddy Colbert III, MD.  2:26 PM 02/06/2018       Scribe Attestation:   Scribe #1: I performed the above scribed service and the documentation accurately describes the services I performed. I attest to the accuracy of the note.            ED Course      Clinical Impression:   No diagnosis found.                             Eddy Colbert III, MD  02/06/18 [id number]

## 2018-03-08 ENCOUNTER — DOCUMENTATION ONLY (OUTPATIENT)
Dept: FAMILY MEDICINE | Facility: CLINIC | Age: 38
End: 2018-03-08

## 2018-03-08 NOTE — PROGRESS NOTES
Pre-Visit Chart Review  For Appointment Scheduled on (3/8/18    Health Maintenance Due   Topic Date Due    Lipid Panel  1982    TETANUS VACCINE  05/09/2000    Pneumococcal PPSV23 (Medium Risk) (1) 05/09/2000    Pap Smear with HPV Cotest  05/09/2003    Influenza Vaccine  08/01/2017

## 2019-02-12 NOTE — TREATMENT PLAN
Pt. Pulled out CRISTIAN drain x1 accidentally while asleep.  Notified Chioma Naik -no new orders at this time.   meds reordered for 90 day supply  Call to patient to schedule appt- patient agrees and scheduled for tomorrow

## 2023-08-16 NOTE — HOSPITAL COURSE
Patient underwent CT abdomen which demonstrated  Interval laparotomy and internal hernia reduction with resolution of previously noted volvulus. Complete results below. General surgery consulted. No surgical intervention required and she was cleared for DC from surgical standpoint. She received antiemetics and analgesics during admission. Potassium replaced with oral supplementation.  She was given Ferrlecit IV x 1 dose. She was noted to have LE swelling and underwent LE venous US which is positive for DVT.  Patient unable to take OAC due to contraindications with tegretol. Will need to be placed on Lovenox pending hematology and psychiatry follow up. Long discussion with patient regarding outpatient POC.    [de-identified] : Physical therapy prescribed for strengthening and stretching.  Advised patient on August 28th, to start wall walks and 7 days after that to start professional physical therapy. (September 4). Patient will follow up in October, email provided.   Aug 22nd, going on cruise to [place].  ----------------------------------------------- Home Exercise The patient is instructed on a home exercise program.  SABRINA LEE Acting as a Scribe for Dr. Terry GIRARD, Sabrina Lee, attest that this documentation has been prepared under the direction and in the presence of Provider Chapo Stewart MD.  Activity Modification The patient was advised to modify their activities.  Dx / Natural History The patient was advised of the diagnosis.  The natural history of the pathology was explained in full to the patient in layman's terms.  Several different treatment options were discussed and explained in full to the patient including the risks and benefits of both surgical and non-surgical treatments.  All questions and concerns were answered.  Pain Guide Activities The patient was advised to let pain guide the gradual advancement of activities.  PARMJIT GIRARD explained to the patient that rest, ice, compression, and elevation would benefit them.  They may return to activity after follow-up or when they no longer have any pain.  The patient's current medication management of their orthopedic diagnosis was reviewed today: (1) We discussed a comprehensive treatment plan that included possible pharmaceutical management involving the use of prescription strength medications including but not limited to options such as oral Naprosyn 500mg BID, once daily Meloxicam 15 mg, or 500-650 mg Tylenol versus over the counter oral medications and topical prescription NSAID Pennsaid vs over the counter Voltaren gel. (2) There is a moderate risk of morbidity with further treatment, especially from use of prescription strength medications and possible side effects of these medications which include upset stomach with oral medications, skin reactions to topical medications and cardiac/renal issues with long term use. (3) I recommended that the patient follow-up with their medical physician to discuss any significant specific potential issues with long term medication use such as interactions with current medications or with exacerbation of underlying medical comorbidities. (4) The benefits and risks associated with use of injectable, oral or topical, prescription and over the counter anti-inflammatory medications were discussed with the patient. The patient voiced understanding of the risks including but not limited to bleeding, stroke, kidney dysfunction, heart disease, and were referred to the black box warning label for further information.

## 2024-11-11 ENCOUNTER — OFFICE VISIT (OUTPATIENT)
Dept: URGENT CARE | Facility: CLINIC | Age: 44
End: 2024-11-11
Payer: MEDICARE

## 2024-11-11 VITALS
RESPIRATION RATE: 18 BRPM | TEMPERATURE: 97 F | HEART RATE: 70 BPM | DIASTOLIC BLOOD PRESSURE: 73 MMHG | BODY MASS INDEX: 30.66 KG/M2 | WEIGHT: 178.63 LBS | SYSTOLIC BLOOD PRESSURE: 104 MMHG | OXYGEN SATURATION: 99 %

## 2024-11-11 DIAGNOSIS — Z20.818 EXPOSURE TO STREP THROAT: Primary | ICD-10-CM

## 2024-11-11 DIAGNOSIS — J02.0 STREP PHARYNGITIS: ICD-10-CM

## 2024-11-11 LAB
CTP QC/QA: YES
S PYO RRNA THROAT QL PROBE: POSITIVE

## 2024-11-11 PROCEDURE — 87880 STREP A ASSAY W/OPTIC: CPT | Mod: QW,,, | Performed by: STUDENT IN AN ORGANIZED HEALTH CARE EDUCATION/TRAINING PROGRAM

## 2024-11-11 PROCEDURE — 99204 OFFICE O/P NEW MOD 45 MIN: CPT | Mod: S$GLB,,, | Performed by: STUDENT IN AN ORGANIZED HEALTH CARE EDUCATION/TRAINING PROGRAM

## 2024-11-11 RX ORDER — CHLOPHEDIANOL HCL AND PYRILAMINE MALEATE 12.5; 12.5 MG/5ML; MG/5ML
5 SOLUTION ORAL
Qty: 118 ML | Refills: 0 | Status: SHIPPED | OUTPATIENT
Start: 2024-11-11

## 2024-11-11 RX ORDER — ONDANSETRON 4 MG/1
4 TABLET, ORALLY DISINTEGRATING ORAL EVERY 6 HOURS PRN
Qty: 15 TABLET | Refills: 0 | Status: SHIPPED | OUTPATIENT
Start: 2024-11-11

## 2024-11-11 RX ORDER — AMOXICILLIN 875 MG/1
875 TABLET, FILM COATED ORAL EVERY 12 HOURS
Qty: 20 TABLET | Refills: 0 | Status: SHIPPED | OUTPATIENT
Start: 2024-11-11 | End: 2024-11-21

## 2024-11-11 NOTE — PROGRESS NOTES
Subjective:      Patient ID: Amaris Kruger is a 44 y.o. female.    Vitals:  weight is 81 kg (178 lb 9.6 oz). Her oral temperature is 97 °F (36.1 °C). Her blood pressure is 104/73 and her pulse is 70. Her respiration is 18 and oxygen saturation is 99%.     Chief Complaint: Headache and Nasal Congestion    Patient is a 44-year-old female with a past medical history of bipolar disorder and anemia who presents to clinic with family for evaluation possible strep throat infection.  Patient reports she has experienced symptoms for between 1 and 2 weeks now.  Patient reports exposed to family with strep throat.  Patient reports she is not vaccinated.  Patient reports no over-the-counter medications for symptoms at this point.    Headache   This is a new problem. The current episode started in the past 7 days. The problem has been unchanged. Associated symptoms include coughing, nausea and a sore throat. Pertinent negatives include no abdominal pain, dizziness, ear pain or vomiting.       Constitution: Positive for chills and fatigue.   HENT:  Positive for congestion, postnasal drip and sore throat. Negative for ear pain.    Neck: neck negative.   Cardiovascular: Negative.  Negative for chest pain and palpitations.   Eyes: Negative.    Respiratory:  Positive for cough. Negative for chest tightness, sputum production and shortness of breath.    Gastrointestinal:  Positive for nausea. Negative for abdominal pain, vomiting and diarrhea.   Endocrine: negative.   Genitourinary: Negative.  Negative for dysuria, frequency and urgency.   Musculoskeletal:  Positive for muscle ache.   Skin: Negative.  Negative for color change, pale, rash and erythema.   Allergic/Immunologic: Negative.    Neurological:  Positive for headaches. Negative for dizziness, light-headedness, passing out, disorientation and altered mental status.   Hematologic/Lymphatic: Negative.    Psychiatric/Behavioral: Negative.  Negative for altered mental  status, disorientation and confusion.       Objective:     Physical Exam   Constitutional: She is oriented to person, place, and time. She appears well-developed. She is cooperative.  Non-toxic appearance. She does not appear ill. No distress.   HENT:   Head: Normocephalic and atraumatic.   Ears:   Right Ear: Hearing, tympanic membrane, external ear and ear canal normal.   Left Ear: Hearing, tympanic membrane, external ear and ear canal normal.   Nose: Congestion present. No mucosal edema, rhinorrhea or nasal deformity. No epistaxis. Right sinus exhibits no maxillary sinus tenderness and no frontal sinus tenderness. Left sinus exhibits no maxillary sinus tenderness and no frontal sinus tenderness.   Mouth/Throat: Uvula is midline and mucous membranes are normal. Mucous membranes are moist. No trismus in the jaw. Normal dentition. No uvula swelling. Posterior oropharyngeal erythema present. No oropharyngeal exudate. Oropharynx is clear.   Eyes: Conjunctivae and lids are normal. Pupils are equal, round, and reactive to light. Right eye exhibits no discharge. Left eye exhibits no discharge. No scleral icterus.   Neck: Trachea normal and phonation normal. Neck supple. No neck rigidity present.   Cardiovascular: Normal rate, regular rhythm and normal pulses.   Pulmonary/Chest: Effort normal and breath sounds normal. No respiratory distress. She has no wheezes. She has no rhonchi. She has no rales.   Abdominal: Normal appearance and bowel sounds are normal. She exhibits no distension. Soft. There is no abdominal tenderness.   Musculoskeletal: Normal range of motion.         General: Normal range of motion.      Cervical back: She exhibits no tenderness.   Lymphadenopathy:     She has no cervical adenopathy.   Neurological: She is alert and oriented to person, place, and time. She exhibits normal muscle tone.   Skin: Skin is warm, dry, intact, not diaphoretic, not pale and no rash. Capillary refill takes 2 to 3 seconds. No  erythema   Psychiatric: Her speech is normal and behavior is normal. Judgment and thought content normal.   Nursing note and vitals reviewed.      Assessment:     1. Exposure to strep throat    2. Strep pharyngitis        Plan:       Exposure to strep throat  -     POCT rapid strep A    Strep pharyngitis    Other orders  -     amoxicillin (AMOXIL) 875 MG tablet; Take 1 tablet (875 mg total) by mouth every 12 (twelve) hours. for 10 days  Dispense: 20 tablet; Refill: 0  -     pyrilamine-chlophedianoL (NINJACOF) 12.5-12.5 mg/5 mL Liqd; Take 5 mLs by mouth every 6 to 8 hours as needed (Cough).  Dispense: 118 mL; Refill: 0  -     ondansetron (ZOFRAN-ODT) 4 MG TbDL; Take 1 tablet (4 mg total) by mouth every 6 (six) hours as needed (Nausea).  Dispense: 15 tablet; Refill: 0                Labs:  Rapid strep positive.  Take medications as prescribed.  Tylenol/Motrin per package instructions for any pain or fever.  Recommend warm salt water gargles every 2-3 hours while awake.  Recommend replacing toothbrush and washing linens in hot water 48 hours after starting antibiotics.  Follow-up with PCP in 1-2 days.  Follow-up ENT as needed.  Return to clinic as needed.  To ED for any new or acutely worsening symptoms.  Patient in agreement with plan of care.    DISCLAIMER: Please note that my documentation in this Electronic Healthcare Record was produced using speech recognition software and therefore may contain errors related to that software system.These could include grammar, punctuation and spelling errors or the inclusion/exclusion of phrases that were not intended. Garbled syntax, mangled pronouns, and other bizarre constructions may be attributed to that software system.

## 2024-12-10 ENCOUNTER — OFFICE VISIT (OUTPATIENT)
Dept: URGENT CARE | Facility: CLINIC | Age: 44
End: 2024-12-10
Payer: MEDICARE

## 2024-12-10 VITALS
RESPIRATION RATE: 16 BRPM | WEIGHT: 178 LBS | OXYGEN SATURATION: 97 % | DIASTOLIC BLOOD PRESSURE: 72 MMHG | BODY MASS INDEX: 30.39 KG/M2 | HEART RATE: 69 BPM | TEMPERATURE: 98 F | HEIGHT: 64 IN | SYSTOLIC BLOOD PRESSURE: 114 MMHG

## 2024-12-10 DIAGNOSIS — J02.9 SORE THROAT: Primary | ICD-10-CM

## 2024-12-10 DIAGNOSIS — J02.0 STREP PHARYNGITIS: ICD-10-CM

## 2024-12-10 LAB
CTP QC/QA: YES
S PYO RRNA THROAT QL PROBE: POSITIVE

## 2024-12-10 PROCEDURE — 99214 OFFICE O/P EST MOD 30 MIN: CPT | Mod: S$GLB,,, | Performed by: STUDENT IN AN ORGANIZED HEALTH CARE EDUCATION/TRAINING PROGRAM

## 2024-12-10 PROCEDURE — 87880 STREP A ASSAY W/OPTIC: CPT | Mod: QW,,, | Performed by: STUDENT IN AN ORGANIZED HEALTH CARE EDUCATION/TRAINING PROGRAM

## 2024-12-10 RX ORDER — PROMETHAZINE HYDROCHLORIDE AND DEXTROMETHORPHAN HYDROBROMIDE 6.25; 15 MG/5ML; MG/5ML
5 SYRUP ORAL
Qty: 118 ML | Refills: 0 | Status: SHIPPED | OUTPATIENT
Start: 2024-12-10 | End: 2024-12-20

## 2024-12-10 RX ORDER — CEFDINIR 300 MG/1
300 CAPSULE ORAL 2 TIMES DAILY
Qty: 20 CAPSULE | Refills: 0 | Status: SHIPPED | OUTPATIENT
Start: 2024-12-10 | End: 2024-12-20

## 2024-12-10 NOTE — PROGRESS NOTES
"Subjective:      Patient ID: Amaris Kruger is a 44 y.o. female.    Vitals:  height is 5' 4" (1.626 m) and weight is 80.7 kg (178 lb). Her oral temperature is 97.9 °F (36.6 °C). Her blood pressure is 114/72 and her pulse is 69. Her respiration is 16 and oxygen saturation is 97%.     Chief Complaint: Sore Throat    Patient is a 44-year-old female with a past medical history of bipolar disorder, anemia and status post gastric weight loss surgery who presents to clinic for evaluation of possible strep throat infection.  Patient reports symptoms x1 week.  Patient reports exposed to family members sick with similar symptoms with 1 of them having RSV and flu.  Patient reports she is not vaccinated.  Patient states intermittently taking ibuprofen and BC powder with mild relief to symptoms.  Patient previously diagnosed with strep throat November 11, 2024 and symptoms reportedly feels similar to that was strep is being requested.    Sore Throat   This is a new problem. The current episode started 1 to 4 weeks ago. The problem has been unchanged. Associated symptoms include congestion and headaches. Pertinent negatives include no abdominal pain, coughing, diarrhea, ear pain, shortness of breath or vomiting.       Constitution: Positive for fatigue and fever (Temperature max 99.9F).   HENT:  Positive for congestion and sore throat. Negative for ear pain.    Neck: neck negative.   Cardiovascular: Negative.  Negative for chest pain and palpitations.   Eyes: Negative.    Respiratory: Negative.  Negative for chest tightness, cough and shortness of breath.    Gastrointestinal:  Positive for nausea. Negative for abdominal pain, vomiting and diarrhea.   Endocrine: negative.   Genitourinary: Negative.  Negative for dysuria, frequency and urgency.   Musculoskeletal:  Positive for muscle ache.   Skin: Negative.  Negative for color change, pale, rash and erythema.   Allergic/Immunologic: Negative.    Neurological:  Positive for " headaches. Negative for dizziness, light-headedness, passing out, disorientation and altered mental status.   Hematologic/Lymphatic: Negative.    Psychiatric/Behavioral: Negative.  Negative for altered mental status, disorientation and confusion.       Objective:     Physical Exam   Constitutional: She is oriented to person, place, and time. She appears well-developed. She is cooperative.  Non-toxic appearance. She does not appear ill. No distress.   HENT:   Head: Normocephalic and atraumatic.   Ears:   Right Ear: Hearing, tympanic membrane, external ear and ear canal normal.   Left Ear: Hearing, tympanic membrane, external ear and ear canal normal.   Nose: Nose normal. No mucosal edema, rhinorrhea, nasal deformity or congestion. No epistaxis. Right sinus exhibits no maxillary sinus tenderness and no frontal sinus tenderness. Left sinus exhibits no maxillary sinus tenderness and no frontal sinus tenderness.   Mouth/Throat: Uvula is midline and mucous membranes are normal. Mucous membranes are moist. No trismus in the jaw. Normal dentition. No uvula swelling. Posterior oropharyngeal erythema present. No oropharyngeal exudate. Oropharynx is clear.   Eyes: Conjunctivae and lids are normal. Pupils are equal, round, and reactive to light. Right eye exhibits no discharge. Left eye exhibits no discharge. No scleral icterus.   Neck: Trachea normal and phonation normal. Neck supple. No neck rigidity present.   Cardiovascular: Normal rate, regular rhythm and normal pulses.   Pulmonary/Chest: Effort normal and breath sounds normal. No respiratory distress. She has no wheezes. She has no rhonchi. She has no rales.   Abdominal: Normal appearance and bowel sounds are normal. She exhibits no distension. Soft. There is no abdominal tenderness.   Musculoskeletal: Normal range of motion.         General: Normal range of motion.      Cervical back: She exhibits no tenderness.   Lymphadenopathy:     She has no cervical adenopathy.    Neurological: She is alert and oriented to person, place, and time. She exhibits normal muscle tone.   Skin: Skin is warm, dry, intact, not diaphoretic, not pale and no rash. Capillary refill takes 2 to 3 seconds. No erythema   Psychiatric: Her speech is normal and behavior is normal. Judgment and thought content normal.   Nursing note and vitals reviewed.      Assessment:     1. Sore throat    2. Strep pharyngitis        Plan:       Sore throat  -     POCT rapid strep A    Strep pharyngitis    Other orders  -     cefdinir (OMNICEF) 300 MG capsule; Take 1 capsule (300 mg total) by mouth 2 (two) times daily. for 10 days  Dispense: 20 capsule; Refill: 0  -     promethazine-dextromethorphan (PROMETHAZINE-DM) 6.25-15 mg/5 mL Syrp; Take 5 mLs by mouth every 4 to 6 hours as needed (Cough).  Dispense: 118 mL; Refill: 0                Previous visit reviewed.  Labs:  Rapid strep positive.  Patient offered RSV, COVID and influenza testing however refused.  Take medications as prescribed.  Tylenol/Motrin per package instructions for any pain or fever.  Recommend warm salt water gargles every 2-3 hours while awake.  Recommend replacing toothbrush and washing linens in hot water 48 hours after starting antibiotics.  Follow-up with PCP in 1-2 days.  Follow-up ENT as needed.  Return to clinic as needed.  To ED for any new or acutely worsening symptoms.  Patient in agreement with plan of care.     DISCLAIMER: Please note that my documentation in this Electronic Healthcare Record was produced using speech recognition software and therefore may contain errors related to that software system.These could include grammar, punctuation and spelling errors or the inclusion/exclusion of phrases that were not intended. Garbled syntax, mangled pronouns, and other bizarre constructions may be attributed to that software system.

## 2025-01-17 ENCOUNTER — OFFICE VISIT (OUTPATIENT)
Dept: URGENT CARE | Facility: CLINIC | Age: 45
End: 2025-01-17
Payer: MEDICARE

## 2025-01-17 VITALS
TEMPERATURE: 98 F | OXYGEN SATURATION: 98 % | SYSTOLIC BLOOD PRESSURE: 108 MMHG | BODY MASS INDEX: 29.37 KG/M2 | DIASTOLIC BLOOD PRESSURE: 73 MMHG | HEART RATE: 97 BPM | RESPIRATION RATE: 18 BRPM | WEIGHT: 172 LBS | HEIGHT: 64 IN

## 2025-01-17 DIAGNOSIS — J02.9 SORE THROAT: Primary | ICD-10-CM

## 2025-01-17 DIAGNOSIS — B96.89 ACUTE BACTERIAL SINUSITIS: ICD-10-CM

## 2025-01-17 DIAGNOSIS — J01.90 ACUTE BACTERIAL SINUSITIS: ICD-10-CM

## 2025-01-17 LAB
CTP QC/QA: YES
FLUAV AG NPH QL: NEGATIVE
FLUBV AG NPH QL: NEGATIVE
S PYO RRNA THROAT QL PROBE: NEGATIVE
SARS-COV-2 AG RESP QL IA.RAPID: NEGATIVE

## 2025-01-17 PROCEDURE — 87804 INFLUENZA ASSAY W/OPTIC: CPT | Mod: QW,,, | Performed by: STUDENT IN AN ORGANIZED HEALTH CARE EDUCATION/TRAINING PROGRAM

## 2025-01-17 PROCEDURE — 87880 STREP A ASSAY W/OPTIC: CPT | Mod: QW,,, | Performed by: STUDENT IN AN ORGANIZED HEALTH CARE EDUCATION/TRAINING PROGRAM

## 2025-01-17 PROCEDURE — 99214 OFFICE O/P EST MOD 30 MIN: CPT | Mod: S$GLB,,, | Performed by: STUDENT IN AN ORGANIZED HEALTH CARE EDUCATION/TRAINING PROGRAM

## 2025-01-17 PROCEDURE — 87811 SARS-COV-2 COVID19 W/OPTIC: CPT | Mod: QW,S$GLB,, | Performed by: STUDENT IN AN ORGANIZED HEALTH CARE EDUCATION/TRAINING PROGRAM

## 2025-01-17 RX ORDER — CHLOPHEDIANOL HCL AND PYRILAMINE MALEATE 12.5; 12.5 MG/5ML; MG/5ML
5-10 SOLUTION ORAL
Qty: 118 ML | Refills: 0 | Status: SHIPPED | OUTPATIENT
Start: 2025-01-17

## 2025-01-17 RX ORDER — FLUTICASONE PROPIONATE 50 MCG
2 SPRAY, SUSPENSION (ML) NASAL DAILY
Qty: 15.8 ML | Refills: 0 | Status: SHIPPED | OUTPATIENT
Start: 2025-01-17

## 2025-01-17 RX ORDER — ALBUTEROL SULFATE 90 UG/1
2 INHALANT RESPIRATORY (INHALATION) EVERY 4 HOURS PRN
COMMUNITY
Start: 2024-02-16 | End: 2025-02-15

## 2025-01-17 RX ORDER — AMOXICILLIN AND CLAVULANATE POTASSIUM 875; 125 MG/1; MG/1
1 TABLET, FILM COATED ORAL EVERY 12 HOURS
Qty: 20 TABLET | Refills: 0 | Status: SHIPPED | OUTPATIENT
Start: 2025-01-17 | End: 2025-01-27

## 2025-01-17 RX ORDER — MELATONIN 1 MG
10 TABLET,CHEWABLE ORAL NIGHTLY
COMMUNITY

## 2025-01-17 NOTE — PROGRESS NOTES
"Subjective:      Patient ID: Amaris Kruger is a 44 y.o. female.    Vitals:  height is 5' 4" (1.626 m) and weight is 78 kg (172 lb). Her temperature is 97.8 °F (36.6 °C). Her blood pressure is 108/73 and her pulse is 97. Her respiration is 18 and oxygen saturation is 98%.     Chief Complaint: Sinus Problem    Patient is a 44-year-old female with a past medical history of bipolar disorder, anemia and status post gastric weight loss surgery who presents to clinic for evaluation of sinus related symptoms.  Patient reports symptoms x1 week.  Patient reports her sister had the flu last week.  Patient states no over-the-counter medications for symptoms at this point.  Patient reports she is not vaccinated.    Sinus Problem  This is a new problem. The current episode started in the past 7 days (x's 4 days). The problem has been gradually worsening since onset. Associated symptoms include congestion, coughing, ear pain (Ear fullness), headaches (Reports sinus related), sinus pressure, sneezing and a sore throat (Reports scratchy). Pertinent negatives include no chills, diaphoresis or shortness of breath. Past treatments include nothing.       Constitution: Positive for fatigue. Negative for chills, sweating and fever.   HENT:  Positive for ear pain (Ear fullness), congestion, postnasal drip, sinus pressure and sore throat (Reports scratchy). Negative for ear discharge, tinnitus and hearing loss.    Neck: neck negative.   Cardiovascular: Negative.  Negative for chest pain and palpitations.   Eyes: Negative.    Respiratory:  Positive for cough. Negative for chest tightness, sputum production and shortness of breath.    Gastrointestinal: Negative.  Negative for abdominal pain, nausea, vomiting and diarrhea.   Endocrine: negative.   Genitourinary: Negative.  Negative for dysuria, frequency and urgency.   Musculoskeletal: Negative.    Skin: Negative.  Negative for color change, pale, rash and erythema. "   Allergic/Immunologic: Positive for sneezing.   Neurological:  Positive for headaches (Reports sinus related). Negative for dizziness, light-headedness, passing out, disorientation and altered mental status.   Hematologic/Lymphatic: Negative.    Psychiatric/Behavioral: Negative.  Negative for altered mental status, disorientation and confusion.       Objective:     Physical Exam   Constitutional: She is oriented to person, place, and time. She appears well-developed. She is cooperative.  Non-toxic appearance. She does not appear ill. No distress.   HENT:   Head: Normocephalic and atraumatic.   Ears:   Right Ear: Hearing, tympanic membrane, external ear and ear canal normal.   Left Ear: Hearing, tympanic membrane, external ear and ear canal normal.   Nose: Congestion present. No mucosal edema, rhinorrhea or nasal deformity. No epistaxis. Right sinus exhibits maxillary sinus tenderness. Right sinus exhibits no frontal sinus tenderness. Left sinus exhibits maxillary sinus tenderness. Left sinus exhibits no frontal sinus tenderness.   Mouth/Throat: Uvula is midline, oropharynx is clear and moist and mucous membranes are normal. Mucous membranes are moist. No trismus in the jaw. Normal dentition. No uvula swelling. No oropharyngeal exudate or posterior oropharyngeal erythema (Postnasal drainage to oropharynx). Oropharynx is clear.   Eyes: Conjunctivae and lids are normal. Pupils are equal, round, and reactive to light. Right eye exhibits no discharge. Left eye exhibits no discharge. No scleral icterus.   Neck: Trachea normal and phonation normal. Neck supple. No neck rigidity present.   Cardiovascular: Normal rate, regular rhythm, normal heart sounds and normal pulses.   Pulmonary/Chest: Effort normal and breath sounds normal. No respiratory distress. She has no wheezes. She has no rhonchi. She has no rales.   Abdominal: Normal appearance and bowel sounds are normal. She exhibits no distension. Soft. There is no  abdominal tenderness.   Musculoskeletal: Normal range of motion.         General: Normal range of motion.      Cervical back: She exhibits no tenderness.   Lymphadenopathy:     She has no cervical adenopathy.   Neurological: She is alert and oriented to person, place, and time. She exhibits normal muscle tone.   Skin: Skin is warm, dry, intact, not diaphoretic, not pale and no rash. Capillary refill takes less than 2 seconds. No erythema   Psychiatric: Her speech is normal and behavior is normal. Judgment and thought content normal.   Nursing note and vitals reviewed.      Assessment:     1. Sore throat    2. Acute bacterial sinusitis        Plan:       Sore throat  -     SARS Coronavirus 2 Antigen, POCT Manual Read  -     POCT Influenza A/B Rapid Antigen  -     POCT rapid strep A    Acute bacterial sinusitis    Other orders  -     amoxicillin-clavulanate 875-125mg (AUGMENTIN) 875-125 mg per tablet; Take 1 tablet by mouth every 12 (twelve) hours. for 10 days  Dispense: 20 tablet; Refill: 0  -     fluticasone propionate (FLONASE) 50 mcg/actuation nasal spray; 2 sprays (100 mcg total) by Each Nostril route once daily.  Dispense: 15.8 mL; Refill: 0  -     pyrilamine-chlophedianoL (NINJACOF) 12.5-12.5 mg/5 mL Liqd; Take 5-10 mLs by mouth every 6 to 8 hours as needed (Cough).  Dispense: 118 mL; Refill: 0                Labs: Influenza A and B negative. Covid negative. Rapid strep negative.  Take medications as prescribed.  Tylenol/Motrin per package instructions for any pain or fever.  Assure adequate hydration and rest.  Throat lozenges or Chloraseptic per package instructions for sore throat.    Warm salt water gargles every 2-3 hours as needed for sore throat.    Nasal saline flushes or irrigation as directed for nasal saline congestion and sinus related symptoms.  Follow-up with PCP in 1-2 days.  Return to clinic as needed.  To ED for any new or acutely worsening symptoms.  Patient in agreement with plan of  care.    DISCLAIMER: Please note that my documentation in this Electronic Healthcare Record was produced using speech recognition software and therefore may contain errors related to that software system.These could include grammar, punctuation and spelling errors or the inclusion/exclusion of phrases that were not intended. Garbled syntax, mangled pronouns, and other bizarre constructions may be attributed to that software system.

## 2025-02-19 ENCOUNTER — OFFICE VISIT (OUTPATIENT)
Dept: URGENT CARE | Facility: CLINIC | Age: 45
End: 2025-02-19
Payer: MEDICARE

## 2025-02-19 VITALS
DIASTOLIC BLOOD PRESSURE: 72 MMHG | WEIGHT: 172 LBS | RESPIRATION RATE: 18 BRPM | BODY MASS INDEX: 29.37 KG/M2 | TEMPERATURE: 98 F | HEIGHT: 64 IN | HEART RATE: 71 BPM | OXYGEN SATURATION: 98 % | SYSTOLIC BLOOD PRESSURE: 109 MMHG

## 2025-02-19 DIAGNOSIS — S46.811A STRAIN OF RIGHT TRAPEZIUS MUSCLE, INITIAL ENCOUNTER: Primary | ICD-10-CM

## 2025-02-19 RX ORDER — PREDNISONE 10 MG/1
10 TABLET ORAL DAILY
Qty: 5 TABLET | Refills: 0 | Status: SHIPPED | OUTPATIENT
Start: 2025-02-19 | End: 2025-02-24

## 2025-02-19 NOTE — LETTER
February 19, 2025      [facility]  [address]  [address]  [place] [address]  Phone: [phone]  Fax: [phone]       Patient: Amaris Kruger   YOB: 1980  Date of Visit: 02/19/2025    To Whom It May Concern:    Christoph Kruger  was at [facility] on 02/19/2025. The patient may return to work/school on 02/21/2025 with no restrictions. If you have any questions or concerns, or if I can be of further assistance, please do not hesitate to contact me.    Sincerely,    Paulette Kearns MA

## 2025-02-19 NOTE — PROGRESS NOTES
"Subjective:      Patient ID: Amaris Kruger is a 44 y.o. female.    Vitals:  height is 5' 4" (1.626 m) and weight is 78 kg (172 lb). Her temperature is 98.1 °F (36.7 °C). Her blood pressure is 109/72 and her pulse is 71. Her respiration is 18 and oxygen saturation is 98%.     Chief Complaint: Back Pain    44-year-old afebrile female who presents today with complaint of right upper back pain for the past 2 days.  She explains that she was repetitively lifting a 5 lb bucket of ice over her head 2 days ago and reports that she was sore to this area afterwards.  She reports that pain is worse with turning her head to the right.    Back Pain  This is a new problem. The current episode started yesterday. The problem has been gradually worsening since onset. The pain is present in the thoracic spine. She has tried NSAIDs for the symptoms.       Musculoskeletal:  Positive for back pain.      Objective:     Physical Exam   Constitutional: She is oriented to person, place, and time.  Non-toxic appearance. She does not appear ill. No distress. normal  HENT:   Head: Normocephalic and atraumatic.   Nose: Nose normal.   Mouth/Throat: Mucous membranes are moist. No posterior oropharyngeal erythema. Oropharynx is clear.   Eyes: Conjunctivae are normal. Pupils are equal, round, and reactive to light. Extraocular movement intact   Neck: Neck supple. No neck rigidity present.   Cardiovascular: Normal rate, regular rhythm, normal heart sounds and normal pulses.   Pulmonary/Chest: Effort normal and breath sounds normal.   Abdominal: Normal appearance.   Musculoskeletal: Normal range of motion.         General: Tenderness present. No swelling, deformity or signs of injury. Normal range of motion.      Cervical back: She exhibits no tenderness.      Comments: Positive spasm/tenderness to palpation to right trapezius area.   Lymphadenopathy:     She has no cervical adenopathy.   Neurological: She is alert and oriented to person, " place, and time.   Skin: Skin is warm, dry and not diaphoretic.   Psychiatric: Her behavior is normal.   Vitals reviewed.      Assessment:     1. Strain of right trapezius muscle, initial encounter        Plan:       Strain of right trapezius muscle, initial encounter  -     predniSONE (DELTASONE) 10 MG tablet; Take 1 tablet (10 mg total) by mouth once daily. for 5 days  Dispense: 5 tablet; Refill: 0  -     Ambulatory referral/consult to Orthopedics      INSTRUCTIONS  Medications as prescribed.  Ice to area as instructed.  Follow up with Orthopedics as scheduled.  To ER for worsening of symptoms, or for any new symptoms as discussed.

## 2025-02-21 ENCOUNTER — OFFICE VISIT (OUTPATIENT)
Dept: URGENT CARE | Facility: CLINIC | Age: 45
End: 2025-02-21
Payer: OTHER MISCELLANEOUS

## 2025-02-21 VITALS
SYSTOLIC BLOOD PRESSURE: 106 MMHG | OXYGEN SATURATION: 98 % | TEMPERATURE: 98 F | RESPIRATION RATE: 20 BRPM | HEART RATE: 79 BPM | WEIGHT: 170 LBS | HEIGHT: 64 IN | BODY MASS INDEX: 29.02 KG/M2 | DIASTOLIC BLOOD PRESSURE: 70 MMHG

## 2025-02-21 DIAGNOSIS — S16.1XXS CERVICAL STRAIN, ACUTE, SEQUELA: Primary | ICD-10-CM

## 2025-02-21 DIAGNOSIS — S23.9XXS THORACIC BACK SPRAIN, SEQUELA: ICD-10-CM

## 2025-02-21 RX ORDER — METHOCARBAMOL 500 MG/1
500 TABLET, FILM COATED ORAL 3 TIMES DAILY
Qty: 30 TABLET | Refills: 0 | Status: SHIPPED | OUTPATIENT
Start: 2025-02-21

## 2025-02-21 NOTE — PROGRESS NOTES
"Subjective:      Patient ID: Amaris Kruger is a 44 y.o. female.    Vitals:  height is 5' 4" (1.626 m) and weight is 77.1 kg (170 lb). Her temperature is 98.4 °F (36.9 °C). Her blood pressure is 106/70 and her pulse is 79. Her respiration is 20 and oxygen saturation is 98%.     Chief Complaint: No chief complaint on file.  44 year female presents to clinic for follow-up visit.  She sustained injury at work while lifting a 5 gal bucket of ice over her head to poor into a drink machine.  Today she complains of lower cervical and upper thoracic pain.  She denies any numbness or tingling.  She describes the pain as moderate.  She rates the pain at 7/10 at worst.  She was recently taking steroids without noticeable improvement.  Previous medical history includes anemia, status post bariatric surgery, bipolar disorder, active smoker, and transaminitis.  DOI: 2/17/2025 pt states "Since her last visit she has taken her steroid pack and has no relief her sxs have worsened. Pt tried to see ortho but she doesn't have a WC clain number yet. Her current pain level is a 6-7."      Constitution: Negative.   HENT: Negative.     Neck: Positive for neck pain. Negative for neck stiffness and neck swelling.   Cardiovascular: Negative.    Eyes: Negative.    Respiratory: Negative.     Gastrointestinal: Negative.    Musculoskeletal:  Positive for back pain.   Skin: Negative.  Negative for erythema.   Neurological: Negative.    Hematologic/Lymphatic: Negative.       Objective:     Physical Exam   Constitutional: She is oriented to person, place, and time.  Non-toxic appearance. She does not appear ill. No distress. normal  HENT:   Head: Normocephalic.   Ears:   Right Ear: Tympanic membrane normal.   Left Ear: Tympanic membrane normal.   Nose: Nose normal.   Mouth/Throat: Mucous membranes are moist. No oropharyngeal exudate or posterior oropharyngeal erythema. Oropharynx is clear.   Eyes: Pupils are equal, round, and reactive to " light.   Neck:       Cardiovascular: Normal rate, regular rhythm, normal heart sounds and normal pulses.   No murmur heard.Exam reveals no gallop.   Pulmonary/Chest: Effort normal and breath sounds normal. No respiratory distress. She has no wheezes. She has no rales.   Abdominal: Normal appearance. Soft. There is no abdominal tenderness.   Musculoskeletal:         General: Tenderness and signs of injury present.      Cervical back: She exhibits tenderness.   Neurological: no focal deficit. She is alert and oriented to person, place, and time.   Skin: Skin is warm, dry, not pale and no rash. Capillary refill takes less than 2 seconds. No erythema   Psychiatric: Her behavior is normal. Mood, judgment and thought content normal.   Nursing note and vitals reviewed.      Assessment:     1. Cervical strain, acute, sequela    2. Thoracic back sprain, sequela        Plan:   Vital signs stable.  Neuro exam within normal limits.  No numbness or tingling.  X-rays failed to demonstrate any acute fracture or subluxation.  Ortho referral placed.  Robaxin p.r.gilma..        Cervical Spine    FINDINGS:  Vertebral body heights are preserved.  No fractures or bony destructive changes.  Straightening of the expected normal cervical lordosis and grade 1 retrolisthesis of C5 on C6.  Moderate to severe disc height loss at C5-C6 with shallow endplate centered osteophytes and mild disc height loss at C4-C5.  No abnormal prevertebral soft tissue thickening.     Impression:     No acute radiographic findings.      Thoracic Spine    FINDINGS:  Thoracic vertebral body heights are preserved.  No bony destructive changes. Sagittal alignment is within normal limits.  Mild broad dextrocurvature of the upper thoracic spine.  Intervertebral disc spaces are preserved.  Visualized lungs reveal no acute process.  Surgical clips project over the left upper abdomen.     Impression:     No acute radiographic abnormality.    Cervical strain, acute, sequela  -      XR Cervical Spine 2 or 3 Views; Future; Expected date: 02/21/2025  -     XR THORACIC SPINE AP LATERAL; Future; Expected date: 02/21/2025  -     Ambulatory referral/consult to Orthopedics  -     methocarbamoL (ROBAXIN) 500 MG Tab; Take 1 tablet (500 mg total) by mouth 3 (three) times daily.  Dispense: 30 tablet; Refill: 0    Thoracic back sprain, sequela  -     methocarbamoL (ROBAXIN) 500 MG Tab; Take 1 tablet (500 mg total) by mouth 3 (three) times daily.  Dispense: 30 tablet; Refill: 0

## 2025-05-22 ENCOUNTER — OFFICE VISIT (OUTPATIENT)
Dept: URGENT CARE | Facility: CLINIC | Age: 45
End: 2025-05-22
Payer: MEDICARE

## 2025-05-22 VITALS
SYSTOLIC BLOOD PRESSURE: 106 MMHG | OXYGEN SATURATION: 96 % | TEMPERATURE: 98 F | HEIGHT: 64 IN | WEIGHT: 161 LBS | BODY MASS INDEX: 27.49 KG/M2 | HEART RATE: 77 BPM | RESPIRATION RATE: 19 BRPM | DIASTOLIC BLOOD PRESSURE: 79 MMHG

## 2025-05-22 DIAGNOSIS — R11.0 NAUSEA: Primary | ICD-10-CM

## 2025-05-22 DIAGNOSIS — R09.81 SINUS CONGESTION: ICD-10-CM

## 2025-05-22 DIAGNOSIS — J06.9 VIRAL URI: ICD-10-CM

## 2025-05-22 LAB
CTP QC/QA: YES
CTP QC/QA: YES
FLUAV AG NPH QL: NEGATIVE
FLUBV AG NPH QL: NEGATIVE
SARS-COV+SARS-COV-2 AG RESP QL IA.RAPID: NEGATIVE

## 2025-05-22 RX ORDER — ONDANSETRON 4 MG/1
4 TABLET, ORALLY DISINTEGRATING ORAL EVERY 6 HOURS PRN
Qty: 20 TABLET | Refills: 0 | Status: SHIPPED | OUTPATIENT
Start: 2025-05-22 | End: 2025-05-27

## 2025-05-22 NOTE — PROGRESS NOTES
"Subjective:      Patient ID: Amaris Kruger is a 45 y.o. female.    Vitals:  height is 5' 4" (1.626 m) and weight is 73 kg (161 lb). Her temperature is 98.4 °F (36.9 °C). Her blood pressure is 106/79 and her pulse is 77. Her respiration is 19 and oxygen saturation is 96%.     Chief Complaint: Nausea    Patient is a 45-year-old female who presents today with headache and sinus congestion for 2 days.  Patient also reports yesterday she began to have occasional nausea which she states she gets from time to time after her gastric bypass surgery.  Patient states her mom not currently lives with them tested positive for COVID today.  She has not had any fevers.        Nausea  This is a new problem. The current episode started in the past 7 days (x's 2 days). The problem has been gradually worsening. Associated symptoms include congestion, headaches and nausea. Pertinent negatives include no chills, diaphoresis, fatigue, fever or vomiting. She has tried nothing for the symptoms.       Constitution: Negative for chills, sweating, fatigue, fever and generalized weakness.   HENT:  Positive for congestion.    Neck: neck negative.   Cardiovascular: Negative.    Eyes: Negative.    Respiratory: Negative.     Gastrointestinal:  Positive for nausea. Negative for abdominal trauma, abdominal bloating and vomiting.   Endocrine: negative.   Genitourinary: Negative.    Musculoskeletal: Negative.    Skin: Negative.  Negative for erythema.   Allergic/Immunologic: Negative.    Neurological:  Positive for headaches.   Hematologic/Lymphatic: Negative.    Psychiatric/Behavioral: Negative.        Objective:     Physical Exam   Constitutional: She is oriented to person, place, and time. She appears well-developed. She is cooperative.  Non-toxic appearance. She does not appear ill. No distress.   HENT:   Head: Normocephalic and atraumatic.   Ears:   Right Ear: Hearing, tympanic membrane, external ear and ear canal normal.   Left Ear: " Hearing, tympanic membrane, external ear and ear canal normal.   Nose: Congestion present. No mucosal edema, rhinorrhea or nasal deformity. No epistaxis. Right sinus exhibits no maxillary sinus tenderness and no frontal sinus tenderness. Left sinus exhibits no maxillary sinus tenderness and no frontal sinus tenderness.   Mouth/Throat: Uvula is midline, oropharynx is clear and moist and mucous membranes are normal. Mucous membranes are moist. No trismus in the jaw. Normal dentition. No uvula swelling. No oropharyngeal exudate or posterior oropharyngeal erythema. Oropharynx is clear.   Eyes: Conjunctivae and lids are normal. Pupils are equal, round, and reactive to light. Right eye exhibits no discharge. Left eye exhibits no discharge. No scleral icterus.   Neck: Trachea normal and phonation normal. Neck supple. No neck rigidity present.   Cardiovascular: Normal rate, regular rhythm, normal heart sounds and normal pulses.   Pulmonary/Chest: Effort normal and breath sounds normal. No respiratory distress. She has no wheezes. She has no rhonchi. She has no rales.   Abdominal: Normal appearance and bowel sounds are normal. She exhibits no distension. Soft. There is no abdominal tenderness. There is no rebound, no guarding, no tenderness at McBurney's point, negative Disla's sign, no left CVA tenderness, negative Rovsing's sign and no right CVA tenderness.   Musculoskeletal: Normal range of motion.         General: Normal range of motion.      Cervical back: She exhibits no tenderness.   Neurological: no focal deficit. She is alert, oriented to person, place, and time and at baseline. She exhibits normal muscle tone.   Skin: Skin is warm, dry, intact, not diaphoretic, not pale and no rash. Capillary refill takes less than 2 seconds. No erythema   Psychiatric: Her speech is normal and behavior is normal. Judgment and thought content normal.   Nursing note and vitals reviewed.      Assessment:     1. Nausea    2. Viral URI     3. Sinus congestion        Plan:       Nausea  -     SARS Coronavirus 2 Antigen, POCT Manual Read  -     POCT Influenza A/B Rapid Antigen  -     ondansetron (ZOFRAN-ODT) 4 MG TbDL; Take 1 tablet (4 mg total) by mouth every 6 (six) hours as needed (nausea).  Dispense: 20 tablet; Refill: 0    Viral URI    Sinus congestion      COVID: Negative  Flu: Negative    History reviewed.  Symptoms likely viral in nature, symptomatic treatment for now.  Patient states she already has Zyrtec and Flonase at home.  Discussed with patient red flags for ER and to follow up with primary care or go to the emergency department if nausea persists.  Patient in agreement with plan of care.

## 2025-05-22 NOTE — PATIENT INSTRUCTIONS
Symptomatic treatment to include:  Rest, increase fluid intake to include electrolyte replacement  Ibuprofen/Tylenol as directed for fever, sore throat, body aches  Zrytec and flonase for sinus symptoms  Warm, salt water gargles, over the counter throat lozenges or sprays as desires.   ER for difficulty breathing not relieved by rest, excessive lethargy and/or change in mental status

## 2025-08-16 ENCOUNTER — OFFICE VISIT (OUTPATIENT)
Dept: URGENT CARE | Facility: CLINIC | Age: 45
End: 2025-08-16
Payer: MEDICARE

## 2025-08-16 VITALS
RESPIRATION RATE: 18 BRPM | DIASTOLIC BLOOD PRESSURE: 73 MMHG | HEART RATE: 77 BPM | SYSTOLIC BLOOD PRESSURE: 105 MMHG | BODY MASS INDEX: 27.49 KG/M2 | TEMPERATURE: 98 F | WEIGHT: 161 LBS | OXYGEN SATURATION: 97 % | HEIGHT: 64 IN

## 2025-08-16 DIAGNOSIS — R50.9 FEVER, UNSPECIFIED FEVER CAUSE: Primary | ICD-10-CM

## 2025-08-16 DIAGNOSIS — H66.91 ACUTE OTITIS MEDIA, RIGHT: ICD-10-CM

## 2025-08-16 LAB
CTP QC/QA: YES
FLUAV AG NPH QL: NEGATIVE
FLUBV AG NPH QL: NEGATIVE
S PYO RRNA THROAT QL PROBE: NEGATIVE
SARS-COV+SARS-COV-2 AG RESP QL IA.RAPID: NEGATIVE

## 2025-08-16 PROCEDURE — 87811 SARS-COV-2 COVID19 W/OPTIC: CPT | Mod: QW,S$GLB,, | Performed by: STUDENT IN AN ORGANIZED HEALTH CARE EDUCATION/TRAINING PROGRAM

## 2025-08-16 PROCEDURE — 87804 INFLUENZA ASSAY W/OPTIC: CPT | Mod: QW,,, | Performed by: STUDENT IN AN ORGANIZED HEALTH CARE EDUCATION/TRAINING PROGRAM

## 2025-08-16 PROCEDURE — 87880 STREP A ASSAY W/OPTIC: CPT | Mod: QW,,, | Performed by: STUDENT IN AN ORGANIZED HEALTH CARE EDUCATION/TRAINING PROGRAM

## 2025-08-16 PROCEDURE — 99214 OFFICE O/P EST MOD 30 MIN: CPT | Mod: S$GLB,,, | Performed by: STUDENT IN AN ORGANIZED HEALTH CARE EDUCATION/TRAINING PROGRAM

## 2025-08-16 RX ORDER — CETIRIZINE HYDROCHLORIDE 10 MG/1
10 TABLET ORAL DAILY
Qty: 30 TABLET | Refills: 0 | Status: SHIPPED | OUTPATIENT
Start: 2025-08-16 | End: 2025-08-16

## 2025-08-16 RX ORDER — CETIRIZINE HYDROCHLORIDE 10 MG/1
10 TABLET ORAL DAILY
Qty: 30 TABLET | Refills: 0 | Status: SHIPPED | OUTPATIENT
Start: 2025-08-16

## 2025-08-16 RX ORDER — METHYLPREDNISOLONE 4 MG/1
TABLET ORAL
Qty: 21 EACH | Refills: 0 | Status: SHIPPED | OUTPATIENT
Start: 2025-08-16 | End: 2025-09-06

## 2025-08-16 RX ORDER — FLUTICASONE PROPIONATE 50 MCG
2 SPRAY, SUSPENSION (ML) NASAL DAILY
Qty: 15.8 ML | Refills: 0 | Status: SHIPPED | OUTPATIENT
Start: 2025-08-16 | End: 2025-08-16

## 2025-08-16 RX ORDER — PROMETHAZINE HYDROCHLORIDE AND DEXTROMETHORPHAN HYDROBROMIDE 6.25; 15 MG/5ML; MG/5ML
5 SYRUP ORAL
Qty: 118 ML | Refills: 0 | Status: SHIPPED | OUTPATIENT
Start: 2025-08-16 | End: 2025-08-16

## 2025-08-16 RX ORDER — AMOXICILLIN AND CLAVULANATE POTASSIUM 875; 125 MG/1; MG/1
1 TABLET, FILM COATED ORAL EVERY 12 HOURS
Qty: 20 TABLET | Refills: 0 | Status: SHIPPED | OUTPATIENT
Start: 2025-08-16 | End: 2025-08-16

## 2025-08-16 RX ORDER — FLUTICASONE PROPIONATE 50 MCG
2 SPRAY, SUSPENSION (ML) NASAL DAILY
Qty: 15.8 ML | Refills: 0 | Status: SHIPPED | OUTPATIENT
Start: 2025-08-16

## 2025-08-16 RX ORDER — METHYLPREDNISOLONE 4 MG/1
TABLET ORAL
Qty: 21 EACH | Refills: 0 | Status: SHIPPED | OUTPATIENT
Start: 2025-08-16 | End: 2025-08-16

## 2025-08-16 RX ORDER — AMOXICILLIN AND CLAVULANATE POTASSIUM 875; 125 MG/1; MG/1
1 TABLET, FILM COATED ORAL EVERY 12 HOURS
Qty: 20 TABLET | Refills: 0 | Status: SHIPPED | OUTPATIENT
Start: 2025-08-16 | End: 2025-08-26

## 2025-08-16 RX ORDER — PROMETHAZINE HYDROCHLORIDE AND DEXTROMETHORPHAN HYDROBROMIDE 6.25; 15 MG/5ML; MG/5ML
5 SYRUP ORAL
Qty: 118 ML | Refills: 0 | Status: SHIPPED | OUTPATIENT
Start: 2025-08-16 | End: 2025-08-26

## (undated) DEVICE — DRAIN SINGLE ROUND 1/4 19FR

## (undated) DEVICE — SOL 9P NACL IRR PIC IL

## (undated) DEVICE — GLOVE SURG ULTRA TOUCH 7.5

## (undated) DEVICE — SLEEVE SCD EXPRESS CALF MEDIUM

## (undated) DEVICE — SPONGE IV DRAIN 4X4 STERILE

## (undated) DEVICE — Device

## (undated) DEVICE — CLOSURE SKIN STERI STRIP 1/2X4

## (undated) DEVICE — DRAPE FLUID WARMER ORS 44X44IN

## (undated) DEVICE — SYR 30CC LUER LOCK

## (undated) DEVICE — ELECTRODE REM PLYHSV RETURN 9

## (undated) DEVICE — SUT SILK 3-0 SH DETACH 30IN

## (undated) DEVICE — EVACUATOR WOUND BULB 100CC

## (undated) DEVICE — SEE MEDLINE ITEM 157117

## (undated) DEVICE — SUT SILK 0 STRANDS 30IN BLK

## (undated) DEVICE — DRESSING ABSRBNT ISLAND 3.6X8

## (undated) DEVICE — DRAPE ABDOMINAL TIBURON 14X11

## (undated) DEVICE — PAD K-THERMIA 24IN X 60IN

## (undated) DEVICE — SUT 0 VICRYL / CT-1

## (undated) DEVICE — NDL SAFETY 21G X 1 1/2 ECLPSE

## (undated) DEVICE — SUT MONOCRYL 4-0 PS-2

## (undated) DEVICE — DRESSING MEPORE 3.6 X 10

## (undated) DEVICE — SUT ETHILON 2-0 FSLX 30 BLK

## (undated) DEVICE — BLADE ELECTRO EXTENDED.

## (undated) DEVICE — SUT SA85H SILK 2-0

## (undated) DEVICE — SEE MEDLINE ITEM 152622

## (undated) DEVICE — SUT 3-0 VICRYL SH CR/8 18

## (undated) DEVICE — SUT SILK BLK. BR. 3-0 10

## (undated) DEVICE — PACK CUSTOM UNIV BASIN SLI

## (undated) DEVICE — APPLICATOR CHLORAPREP ORN 26ML

## (undated) DEVICE — SUT 1 48IN PDS II VIO MONO

## (undated) DEVICE — STAPLER SKIN ROTATING HEAD